# Patient Record
Sex: MALE | Race: ASIAN | NOT HISPANIC OR LATINO
[De-identification: names, ages, dates, MRNs, and addresses within clinical notes are randomized per-mention and may not be internally consistent; named-entity substitution may affect disease eponyms.]

---

## 2017-05-17 ENCOUNTER — APPOINTMENT (OUTPATIENT)
Dept: INTERNAL MEDICINE | Facility: CLINIC | Age: 47
End: 2017-05-17

## 2017-06-05 LAB
ALBUMIN SERPL ELPH-MCNC: 4.4 G/DL
ALP BLD-CCNC: 59 U/L
ALT SERPL-CCNC: 37 U/L
ANION GAP SERPL CALC-SCNC: 14 MMOL/L
AST SERPL-CCNC: 19 U/L
BASOPHILS # BLD AUTO: 0.01 K/UL
BASOPHILS NFR BLD AUTO: 0.2 %
BILIRUB SERPL-MCNC: 0.6 MG/DL
BUN SERPL-MCNC: 23 MG/DL
CALCIUM SERPL-MCNC: 9.6 MG/DL
CHLORIDE SERPL-SCNC: 103 MMOL/L
CO2 SERPL-SCNC: 24 MMOL/L
CREAT SERPL-MCNC: 1.09 MG/DL
EOSINOPHIL # BLD AUTO: 0.07 K/UL
EOSINOPHIL NFR BLD AUTO: 1.6 %
GLUCOSE SERPL-MCNC: 103 MG/DL
HCT VFR BLD CALC: 47.4 %
HGB BLD-MCNC: 15.9 G/DL
IMM GRANULOCYTES NFR BLD AUTO: 0.2 %
LYMPHOCYTES # BLD AUTO: 1.89 K/UL
LYMPHOCYTES NFR BLD AUTO: 44.3 %
MAN DIFF?: NORMAL
MCHC RBC-ENTMCNC: 31.7 PG
MCHC RBC-ENTMCNC: 33.5 GM/DL
MCV RBC AUTO: 94.6 FL
MONOCYTES # BLD AUTO: 0.33 K/UL
MONOCYTES NFR BLD AUTO: 7.7 %
NEUTROPHILS # BLD AUTO: 1.96 K/UL
NEUTROPHILS NFR BLD AUTO: 46 %
PLATELET # BLD AUTO: 155 K/UL
POTASSIUM SERPL-SCNC: 4.1 MMOL/L
PROT SERPL-MCNC: 7.5 G/DL
RBC # BLD: 5.01 M/UL
RBC # FLD: 12.2 %
SODIUM SERPL-SCNC: 141 MMOL/L
WBC # FLD AUTO: 4.27 K/UL

## 2017-06-06 LAB
AFP-TM SERPL-MCNC: 2.7 NG/ML
HAV IGG+IGM SER QL: REACTIVE
HBV DNA # SERPL NAA+PROBE: NOT DETECTED IU/ML
HBV E AB SER QL: POSITIVE
HBV E AG SER QL: NEGATIVE
HBV SURFACE AB SER QL: NONREACTIVE
HBV SURFACE AG SER QL: REACTIVE
HEPB DNA PCR LOG: NOT DETECTED LOGIU/ML

## 2017-06-23 ENCOUNTER — APPOINTMENT (OUTPATIENT)
Age: 47
End: 2017-06-23

## 2017-06-23 VITALS
HEART RATE: 69 BPM | SYSTOLIC BLOOD PRESSURE: 123 MMHG | TEMPERATURE: 97.8 F | WEIGHT: 167 LBS | DIASTOLIC BLOOD PRESSURE: 83 MMHG | RESPIRATION RATE: 17 BRPM | BODY MASS INDEX: 23.91 KG/M2 | HEIGHT: 70 IN

## 2017-07-07 ENCOUNTER — APPOINTMENT (OUTPATIENT)
Age: 47
End: 2017-07-07

## 2017-08-14 ENCOUNTER — APPOINTMENT (OUTPATIENT)
Dept: ULTRASOUND IMAGING | Facility: CLINIC | Age: 47
End: 2017-08-14
Payer: COMMERCIAL

## 2017-08-14 ENCOUNTER — OUTPATIENT (OUTPATIENT)
Dept: OUTPATIENT SERVICES | Facility: HOSPITAL | Age: 47
LOS: 1 days | End: 2017-08-14
Payer: COMMERCIAL

## 2017-08-14 DIAGNOSIS — B18.1 CHRONIC VIRAL HEPATITIS B WITHOUT DELTA-AGENT: ICD-10-CM

## 2017-08-14 PROCEDURE — 76700 US EXAM ABDOM COMPLETE: CPT

## 2017-08-14 PROCEDURE — 76700 US EXAM ABDOM COMPLETE: CPT | Mod: 26

## 2017-11-28 ENCOUNTER — NON-APPOINTMENT (OUTPATIENT)
Age: 47
End: 2017-11-28

## 2017-11-28 ENCOUNTER — APPOINTMENT (OUTPATIENT)
Dept: INTERNAL MEDICINE | Facility: CLINIC | Age: 47
End: 2017-11-28
Payer: COMMERCIAL

## 2017-11-28 VITALS
DIASTOLIC BLOOD PRESSURE: 80 MMHG | SYSTOLIC BLOOD PRESSURE: 129 MMHG | WEIGHT: 170 LBS | HEIGHT: 70 IN | OXYGEN SATURATION: 98 % | HEART RATE: 74 BPM | BODY MASS INDEX: 24.34 KG/M2

## 2017-11-28 PROCEDURE — 99396 PREV VISIT EST AGE 40-64: CPT

## 2017-12-04 LAB
CHOLEST SERPL-MCNC: 151 MG/DL
CHOLEST/HDLC SERPL: 2.5 RATIO
HBA1C MFR BLD HPLC: 5.4 %
HBV DNA # SERPL NAA+PROBE: NOT DETECTED IU/ML
HDLC SERPL-MCNC: 60 MG/DL
HEPB DNA PCR LOG: NOT DETECTED LOGIU/ML
LDLC SERPL CALC-MCNC: 77 MG/DL
TRIGL SERPL-MCNC: 71 MG/DL

## 2018-03-16 LAB
ALBUMIN SERPL ELPH-MCNC: 4.8 G/DL
ALP BLD-CCNC: 50 U/L
ALT SERPL-CCNC: 32 U/L
ANION GAP SERPL CALC-SCNC: 14 MMOL/L
AST SERPL-CCNC: 18 U/L
BASOPHILS # BLD AUTO: 0.01 K/UL
BASOPHILS NFR BLD AUTO: 0.2 %
BILIRUB SERPL-MCNC: 1 MG/DL
BUN SERPL-MCNC: 17 MG/DL
CALCIUM SERPL-MCNC: 9.4 MG/DL
CHLORIDE SERPL-SCNC: 101 MMOL/L
CO2 SERPL-SCNC: 27 MMOL/L
CREAT SERPL-MCNC: 1.04 MG/DL
EOSINOPHIL # BLD AUTO: 0.06 K/UL
EOSINOPHIL NFR BLD AUTO: 1 %
GLUCOSE SERPL-MCNC: 97 MG/DL
HCT VFR BLD CALC: 45.7 %
HGB BLD-MCNC: 15.4 G/DL
IMM GRANULOCYTES NFR BLD AUTO: 0.3 %
LYMPHOCYTES # BLD AUTO: 1.8 K/UL
LYMPHOCYTES NFR BLD AUTO: 30.8 %
MAN DIFF?: NORMAL
MCHC RBC-ENTMCNC: 31.5 PG
MCHC RBC-ENTMCNC: 33.7 GM/DL
MCV RBC AUTO: 93.5 FL
MONOCYTES # BLD AUTO: 0.33 K/UL
MONOCYTES NFR BLD AUTO: 5.6 %
NEUTROPHILS # BLD AUTO: 3.63 K/UL
NEUTROPHILS NFR BLD AUTO: 62.1 %
PLATELET # BLD AUTO: 165 K/UL
POTASSIUM SERPL-SCNC: 4.1 MMOL/L
PROT SERPL-MCNC: 7.8 G/DL
RBC # BLD: 4.89 M/UL
RBC # FLD: 12 %
SODIUM SERPL-SCNC: 142 MMOL/L
WBC # FLD AUTO: 5.85 K/UL

## 2018-05-04 LAB
BASOPHILS # BLD AUTO: 0 K/UL
BASOPHILS NFR BLD AUTO: 0 %
EOSINOPHIL # BLD AUTO: 0.05 K/UL
EOSINOPHIL NFR BLD AUTO: 1.2 %
HCT VFR BLD CALC: 47.6 %
HGB BLD-MCNC: 15.7 G/DL
IMM GRANULOCYTES NFR BLD AUTO: 0.5 %
LYMPHOCYTES # BLD AUTO: 2.01 K/UL
LYMPHOCYTES NFR BLD AUTO: 46.9 %
MAN DIFF?: NORMAL
MCHC RBC-ENTMCNC: 31.1 PG
MCHC RBC-ENTMCNC: 33 GM/DL
MCV RBC AUTO: 94.3 FL
MONOCYTES # BLD AUTO: 0.23 K/UL
MONOCYTES NFR BLD AUTO: 5.4 %
NEUTROPHILS # BLD AUTO: 1.98 K/UL
NEUTROPHILS NFR BLD AUTO: 46 %
PLATELET # BLD AUTO: 187 K/UL
RBC # BLD: 5.05 M/UL
RBC # FLD: 12.1 %
WBC # FLD AUTO: 4.29 K/UL

## 2018-05-07 LAB
AFP-TM SERPL-MCNC: 2.9 NG/ML
ALBUMIN SERPL ELPH-MCNC: 4.4 G/DL
ALP BLD-CCNC: 51 U/L
ALT SERPL-CCNC: 29 U/L
ANION GAP SERPL CALC-SCNC: 18 MMOL/L
AST SERPL-CCNC: 17 U/L
BILIRUB SERPL-MCNC: 0.7 MG/DL
BUN SERPL-MCNC: 14 MG/DL
CALCIUM SERPL-MCNC: 9.1 MG/DL
CHLORIDE SERPL-SCNC: 99 MMOL/L
CO2 SERPL-SCNC: 26 MMOL/L
CREAT SERPL-MCNC: 1.07 MG/DL
GLUCOSE SERPL-MCNC: 99 MG/DL
HBV DNA # SERPL NAA+PROBE: NOT DETECTED
HEPB DNA PCR LOG: NOT DETECTED LOGIU/ML
POTASSIUM SERPL-SCNC: 3.8 MMOL/L
PROT SERPL-MCNC: 8.1 G/DL
SODIUM SERPL-SCNC: 143 MMOL/L

## 2018-05-08 LAB
HBV E AB SER QL: POSITIVE
HBV E AG SER QL: NEGATIVE

## 2018-06-07 ENCOUNTER — FORM ENCOUNTER (OUTPATIENT)
Age: 48
End: 2018-06-07

## 2018-06-08 ENCOUNTER — OUTPATIENT (OUTPATIENT)
Dept: OUTPATIENT SERVICES | Facility: HOSPITAL | Age: 48
LOS: 1 days | End: 2018-06-08
Payer: COMMERCIAL

## 2018-06-08 ENCOUNTER — APPOINTMENT (OUTPATIENT)
Dept: ULTRASOUND IMAGING | Facility: CLINIC | Age: 48
End: 2018-06-08
Payer: COMMERCIAL

## 2018-06-08 DIAGNOSIS — B18.1 CHRONIC VIRAL HEPATITIS B WITHOUT DELTA-AGENT: ICD-10-CM

## 2018-06-08 PROCEDURE — 76700 US EXAM ABDOM COMPLETE: CPT

## 2018-06-08 PROCEDURE — 76700 US EXAM ABDOM COMPLETE: CPT | Mod: 26

## 2018-06-22 ENCOUNTER — APPOINTMENT (OUTPATIENT)
Dept: HEPATOLOGY | Facility: CLINIC | Age: 48
End: 2018-06-22
Payer: COMMERCIAL

## 2018-06-22 VITALS
HEIGHT: 70 IN | SYSTOLIC BLOOD PRESSURE: 132 MMHG | WEIGHT: 176 LBS | TEMPERATURE: 98.4 F | HEART RATE: 76 BPM | BODY MASS INDEX: 25.2 KG/M2 | RESPIRATION RATE: 17 BRPM | DIASTOLIC BLOOD PRESSURE: 83 MMHG

## 2018-06-22 PROCEDURE — 99214 OFFICE O/P EST MOD 30 MIN: CPT

## 2018-07-05 ENCOUNTER — TRANSCRIPTION ENCOUNTER (OUTPATIENT)
Age: 48
End: 2018-07-05

## 2018-07-05 ENCOUNTER — APPOINTMENT (OUTPATIENT)
Dept: HEPATOLOGY | Facility: CLINIC | Age: 48
End: 2018-07-05
Payer: COMMERCIAL

## 2018-07-05 PROCEDURE — 91200 LIVER ELASTOGRAPHY: CPT

## 2018-07-10 ENCOUNTER — APPOINTMENT (OUTPATIENT)
Dept: HEPATOLOGY | Facility: CLINIC | Age: 48
End: 2018-07-10

## 2018-12-05 ENCOUNTER — NON-APPOINTMENT (OUTPATIENT)
Age: 48
End: 2018-12-05

## 2018-12-05 ENCOUNTER — APPOINTMENT (OUTPATIENT)
Dept: INTERNAL MEDICINE | Facility: CLINIC | Age: 48
End: 2018-12-05
Payer: COMMERCIAL

## 2018-12-05 VITALS
BODY MASS INDEX: 25.91 KG/M2 | WEIGHT: 181 LBS | SYSTOLIC BLOOD PRESSURE: 130 MMHG | HEART RATE: 73 BPM | DIASTOLIC BLOOD PRESSURE: 80 MMHG | HEIGHT: 70 IN | OXYGEN SATURATION: 98 %

## 2018-12-05 PROCEDURE — 99396 PREV VISIT EST AGE 40-64: CPT | Mod: 25

## 2018-12-05 PROCEDURE — 93000 ELECTROCARDIOGRAM COMPLETE: CPT

## 2018-12-07 LAB
AFP-TM SERPL-MCNC: 3.4 NG/ML
HBV SURFACE AB SER QL: NONREACTIVE
HBV SURFACE AG SER QL: REACTIVE

## 2018-12-10 ENCOUNTER — FORM ENCOUNTER (OUTPATIENT)
Age: 48
End: 2018-12-10

## 2018-12-10 LAB
ALBUMIN SERPL ELPH-MCNC: 4.8 G/DL
ALP BLD-CCNC: 51 U/L
ALT SERPL-CCNC: 55 U/L
AST SERPL-CCNC: 27 U/L
BILIRUB DIRECT SERPL-MCNC: 0.2 MG/DL
BILIRUB INDIRECT SERPL-MCNC: 0.6 MG/DL
BILIRUB SERPL-MCNC: 0.8 MG/DL
PROT SERPL-MCNC: 7.7 G/DL

## 2018-12-11 ENCOUNTER — APPOINTMENT (OUTPATIENT)
Dept: ULTRASOUND IMAGING | Facility: CLINIC | Age: 48
End: 2018-12-11
Payer: COMMERCIAL

## 2018-12-11 ENCOUNTER — OUTPATIENT (OUTPATIENT)
Dept: OUTPATIENT SERVICES | Facility: HOSPITAL | Age: 48
LOS: 1 days | End: 2018-12-11
Payer: COMMERCIAL

## 2018-12-11 DIAGNOSIS — Z00.8 ENCOUNTER FOR OTHER GENERAL EXAMINATION: ICD-10-CM

## 2018-12-11 LAB
HBV DNA # SERPL NAA+PROBE: NOT DETECTED IU/ML
HEPB DNA PCR LOG: NOT DETECTED LOGIU/ML

## 2018-12-11 PROCEDURE — 76700 US EXAM ABDOM COMPLETE: CPT | Mod: 26

## 2018-12-11 PROCEDURE — 76700 US EXAM ABDOM COMPLETE: CPT

## 2018-12-17 NOTE — HEALTH RISK ASSESSMENT
[Good] : ~his/her~ current health as good [Very Good] : ~his/her~  mood as very good [] : No [No falls in past year] : Patient reported no falls in the past year [0] : 2) Feeling down, depressed, or hopeless: Not at all (0) [de-identified] : but increasing risk - see hpi [[id number]] : 0 [Change in mental status noted] : No change in mental status noted [Language] : denies difficulty with language [Behavior] : denies difficulty with behavior [Learning/Retaining New Information] : denies difficulty learning/retaining new information [Handling Complex Tasks] : denies difficulty handling complex tasks [Reasoning] : denies difficulty with reasoning [Spatial Ability and Orientation] : denies difficulty with spatial ability and orientation [None] : None [With Family] : lives with family [Employed] : employed [] :  [Feels Safe at Home] : Feels safe at home [Fully functional (bathing, dressing, toileting, transferring, walking, feeding)] : Fully functional (bathing, dressing, toileting, transferring, walking, feeding) [Fully functional (using the telephone, shopping, preparing meals, housekeeping, doing laundry, using] : Fully functional and needs no help or supervision to perform IADLs (using the telephone, shopping, preparing meals, housekeeping, doing laundry, using transportation, managing medications and managing finances) [Reports changes in hearing] : Reports no changes in hearing [Reports changes in vision] : Reports no changes in vision [Reports changes in dental health] : Reports no changes in dental health

## 2018-12-17 NOTE — PLAN
[FreeTextEntry1] : Pt had been doing generallly well, with exercise as linchpin of favorable metabolic/cardiovascular status. more recently, however, pt with impairment of ability to exercise well or even maintain safe gait without fall risk because of observed and objectively seen upon exam today right foot drop.  has been going on for months now exam suggests this relates to spinal disease - possibly disc dz, possibly stenosis. given recent bowel/bladder changes will be imperative to r/o  mass as well.  to see gi again regarding possible colonoscopy.  seeing through blood tests needed for hepatic surveillance. we discussed possibility of medications to tx what by hx and exam appears likely to represent bph.  he would not like to be on prescription medications but will consider saw palmetto.

## 2018-12-17 NOTE — PHYSICAL EXAM
[No Acute Distress] : no acute distress [Well Nourished] : well nourished [Well Developed] : well developed [Well-Appearing] : well-appearing [Normal Sclera/Conjunctiva] : normal sclera/conjunctiva [PERRL] : pupils equal round and reactive to light [EOMI] : extraocular movements intact [Normal Outer Ear/Nose] : the outer ears and nose were normal in appearance [Normal Oropharynx] : the oropharynx was normal [No JVD] : no jugular venous distention [Supple] : supple [No Lymphadenopathy] : no lymphadenopathy [Thyroid Normal, No Nodules] : the thyroid was normal and there were no nodules present [No Respiratory Distress] : no respiratory distress  [Clear to Auscultation] : lungs were clear to auscultation bilaterally [No Accessory Muscle Use] : no accessory muscle use [Normal Rate] : normal rate  [Regular Rhythm] : with a regular rhythm [Normal S1, S2] : normal S1 and S2 [No Murmur] : no murmur heard [No Carotid Bruits] : no carotid bruits [No Abdominal Bruit] : a ~M bruit was not heard ~T in the abdomen [No Varicosities] : no varicosities [Pedal Pulses Present] : the pedal pulses are present [No Edema] : there was no peripheral edema [No Extremity Clubbing/Cyanosis] : no extremity clubbing/cyanosis [No Palpable Aorta] : no palpable aorta [Soft] : abdomen soft [Non Tender] : non-tender [Non-distended] : non-distended [No Masses] : no abdominal mass palpated [No HSM] : no HSM [Normal Bowel Sounds] : normal bowel sounds [Normal Sphincter Tone] : normal sphincter tone [No Mass] : no mass [Stool Occult Blood] : stool negative for occult blood [Anus Abnormality] : the anus and perineum were normal [Prostate Tenderness] : the prostate was not tender [No Prostate Nodules] : no prostate nodules [Prostate Size ___ (0-4)] : prostate size [unfilled] (scale: 0-4) [Normal Posterior Cervical Nodes] : no posterior cervical lymphadenopathy [Normal Anterior Cervical Nodes] : no anterior cervical lymphadenopathy [No CVA Tenderness] : no CVA  tenderness [No Spinal Tenderness] : no spinal tenderness [No Joint Swelling] : no joint swelling [Grossly Normal Strength/Tone] : grossly normal strength/tone [No Rash] : no rash [Coordination Grossly Intact] : coordination grossly intact [Normal Affect] : the affect was normal [Normal Insight/Judgement] : insight and judgment were intact [de-identified] : r great toe flexion/extension impaired at 4-/5

## 2018-12-17 NOTE — HISTORY OF PRESENT ILLNESS
[FreeTextEntry1] : annual [de-identified] : Pt with recent issue with R foot which is new for him.  Has felt the R forefoot catch on the ground when he is hurrying places and at times just with walking.  he has nearly fallen several times but has not actually injured self as a result just yet.  he has hisotrically been a runner - but has had to curtail previously usual 3-5 mile run to 0.75 miles as a result.  does not recall recent specific injury to leg nor to back which might account for this.  has also had occaional bloody stool - more like blood admixed with stool.  went to see GI about this yrs ago and all reportedly wnl at that time.  first went at fairly young age btwn 2003-06 by his recollection, then again in 2014.  has had nocturia x1 as well.  no significantly weak stream, though force does seem to be less than prior, dribbling, no hematuria, no family hx.

## 2018-12-18 ENCOUNTER — APPOINTMENT (OUTPATIENT)
Dept: HEPATOLOGY | Facility: CLINIC | Age: 48
End: 2018-12-18

## 2019-01-25 ENCOUNTER — APPOINTMENT (OUTPATIENT)
Dept: HEPATOLOGY | Facility: CLINIC | Age: 49
End: 2019-01-25
Payer: COMMERCIAL

## 2019-01-25 VITALS
HEART RATE: 69 BPM | DIASTOLIC BLOOD PRESSURE: 84 MMHG | BODY MASS INDEX: 25.2 KG/M2 | WEIGHT: 176 LBS | SYSTOLIC BLOOD PRESSURE: 123 MMHG | RESPIRATION RATE: 16 BRPM | HEIGHT: 70 IN

## 2019-01-25 PROCEDURE — 99214 OFFICE O/P EST MOD 30 MIN: CPT

## 2019-01-26 NOTE — HISTORY OF PRESENT ILLNESS
[___ Month(s) Ago] : [unfilled] month(s) ago [None] : ~he/she~ had no significant interval events [Travel to Endemic Area] : travel to an endemic area [Needlestick Exposure] : no needlestick exposure [IV Drug Use] : no IV drug use [Tattoo] : no tattoos [Hemodialysis] : no hemodialysis [Transfusion before 1992] : no transfusion before 1992 [Transplant before 1992] : no transplant before 1992 [Alcohol Abuse] : no alcohol abuse [Autoimmune Disorder] : no autoimmune disorder [Household Contact to HBV] : no household contact to HBV [Occupational Exposure] : no occupational exposure [Wt Gain ___ Lbs] : no recent weight gain [de-identified] : A 48  year-old  American man returns for followup regarding chronic hepatitis B and fatty liver.\par \par He was diagnosed with hepatitis B in childhood when he was in [place] [facility] [place]. He emigrated from [place] in . His parents and brother reportedly do not have hepatitis B. His paternal grandfather  of liver cancer at the age of 40. His wife and children are vaccinated against hepatitis B. \par \par He was started on Viread since May 2014 by PCP due to high hepatitis B viremia and elevated liver enzymes. He was BeAg positive. \par He developed HBeAb positive since 2014 and eAg loss since 10/2016. \par \par An ultrasound of the abdomen from 2016  reported mild hepatic steatosis, unchanged but no focal liver lesion was seen.\par  A Fibroscan from 2016 demonstrated F1 fibrosis.\par \par 10/2016 HCV RNA was undetected, eAg negative, eAb positive, HBsAg positive, anti-HBs negative, AST 20, ALT 32, ALP 63, TB 0.7, CBC normal.\par \par 2016 abdominal US reported echogenic liver suggestive fatty liver, normal spleen and no ascites, patent portal venous system.\par 2016 Bone density was normal. \par \par 2017 HBV DNA undetected, eAb positive, ALT 37, immune to hepatitis A\par He was switched to Vemlidy since 2017. He has tolerated Vemlidy without side effects. \par 2017 HBV DNA undetected, liver tests normal ALT was 32 \par \par 2018 alpha-fetoprotein was normal, eAb positive, HBV DNA undetected,  CBC was normal, liver tests were normal ALT was 29\par 2018 abdominal ultrasound reported diffuse fatty infiltration of the liver\par \par Interval and current history:\par Patient was seen 7 months ago. \par \par 2018 Fibroscan reported S3 steatosis and F0-F1 fibrosis. \par 2018 ALT 53 (55), AST, ALP and TB were normal, AFP normal, HBV DNA undetected, HBsAg positive, anti-HBs negative, eAg negative, eAb positive, \par \par 2018 abdominal US reported unchanged moderate diffuse hepatic steatosis, no focal liver lesion, patent portal veins, normal GB, and bile ducts, and spleen, and no ascites. \par  \par He denies fever, chills, nausea, vomiting, abdominal pain, GI bleeding, jaundice, pruritus, skin rash, anorexia but has fatigue and gained 9 pounds over past 7 months. He has complained of right foot drop which is followed by PCP.

## 2019-01-26 NOTE — REVIEW OF SYSTEMS
[Recent Weight Gain (___ Lbs)] : recent [unfilled] ~Ulb weight gain [As Noted in HPI] : as noted in HPI [Negative] : Heme/Lymph [Fever] : no fever [Chills] : no chills [Feeling Poorly] : not feeling poorly [Feeling Tired] : not feeling tired [Recent Weight Loss (___ Lbs)] : no recent weight loss [Eye Pain] : no eye pain [Red Eyes] : eyes not red [Dry Eyes] : no dryness of the eyes [Chest Pain] : no chest pain [Palpitations] : no palpitations [Lower Ext Edema] : no extremity edema [Shortness Of Breath] : no shortness of breath [Wheezing] : no wheezing [Cough] : no cough [Abdominal Pain] : no abdominal pain [Vomiting] : no vomiting [Constipation] : no constipation [Diarrhea] : no diarrhea [Heartburn] : no heartburn [Melena] : no melena [Itching] : no itching [FreeTextEntry7] : denies rectal bleeding, or nausea

## 2019-01-26 NOTE — ASSESSMENT
[FreeTextEntry1] : A 48  year-old  American man returns for followup regarding chronic hepatitis B and fatty liver.\par \par He was diagnosed with hepatitis B in childhood when he was in [place]. His paternal grandfather  of liver cancer at the age of 40. His wife and children are vaccinated against hepatitis B. \par \par He was started on Viread since May 2014 by PCP due to high hepatitis B viremia, eAg positive, and elevated liver enzymes. HBV eAb became  positive  since 2014 and eAg loss noted since 10/2016. \par \par ALT became normal in 2014.  HBV DNA has been undetected since 2017.\par Fatty liver was seen on abdominal US since  but no focal liver mass on liver imaging over past several years. \par \par He was switched to Vemlidy since 2017. He has  tolerated Vemlidy without side effects. \par \par 2018 Fibroscan reported S3 steatosis and F0-F1 fibrosis. \par \par His most recent blood work from 2018  showed ALT of 53 and 55 respectively but HBV DNA was undetected. HBV serology unchanged. \par \par 2018 abdominal US reported unchanged moderate diffuse hepatic steatosis, no focal liver lesion, patent portal veins, normal GB, and bile ducts, and spleen, and no ascites. \par \par He had elevated ALT despite undetected HBV DNA. The cause of elevated ALT is unclear and unlikely from HBV infection, possibly from NAFLD with SANCHEZ. \par \par I have explained to him and educated him at length the natural history of chronic hepatitis B, and NAFLD and SANCHEZ, disease progression to cirrhosis and risk of hepatocellular carcinoma, the risks and benefits of hepatitis B treatment and side effects of Vemlidy.\par \par I have advised him to continue Vemlidy 25 mg daily. \par I have requested blood work to trend ALT and workup for elevated ALT to be done in 2 months and asked him to call for results.\par \par I have recommended healthy lifestyle, regular exercise to reduce the risk of non-alcoholic fatty liver disease, avoidance of hepatotoxic agents, and a return for followup in 6 months.

## 2019-01-26 NOTE — CONSULT LETTER
[Dear  ___] : Dear  [unfilled], [Courtesy Letter:] : I had the pleasure of seeing your patient, [unfilled], in my office today. [Please see my note below.] : Please see my note below. [Consult Closing:] : Thank you very much for allowing me to participate in the care of this patient.  If you have any questions, please do not hesitate to contact me. [Sincerely,] : Sincerely, [Préez Galvez M.D.] : Pérez Galvez M.D. [FreeTextEntry2] : Anthony Vicente MD

## 2019-01-26 NOTE — PHYSICAL EXAM
[Scleral Icterus] : No Scleral Icterus [Spider Angioma] : No spider angioma(s) were observed [Abdominal  Ascites] : no ascites [Splenomegaly] : no splenomegaly [Liver Palpable ___ Finger Breadths Below Costal Margin] : was not palpable below costal margin [Asterixis] : no asterixis observed [Jaundice] : No jaundice

## 2019-03-22 LAB — CERULOPLASMIN SERPL-MCNC: 18 MG/DL

## 2019-03-23 ENCOUNTER — TRANSCRIPTION ENCOUNTER (OUTPATIENT)
Age: 49
End: 2019-03-23

## 2019-03-23 LAB
ALBUMIN SERPL ELPH-MCNC: 4.8 G/DL
ALP BLD-CCNC: 50 U/L
ALT SERPL-CCNC: 37 U/L
AST SERPL-CCNC: 26 U/L
BILIRUB DIRECT SERPL-MCNC: 0.2 MG/DL
BILIRUB INDIRECT SERPL-MCNC: 0.7 MG/DL
BILIRUB SERPL-MCNC: 0.8 MG/DL
DEPRECATED KAPPA LC FREE/LAMBDA SER: 1 RATIO
IGA SER QL IEP: 212 MG/DL
IGG SER QL IEP: 1446 MG/DL
IGM SER QL IEP: 85 MG/DL
KAPPA LC CSF-MCNC: 1.41 MG/DL
KAPPA LC SERPL-MCNC: 1.41 MG/DL
PROT SERPL-MCNC: 8 G/DL

## 2019-03-24 LAB — SMOOTH MUSCLE AB SER QL IF: NORMAL

## 2019-03-26 LAB
ANA SER IF-ACNC: NEGATIVE
HBV DNA # SERPL NAA+PROBE: NOT DETECTED IU/ML
HEPB DNA PCR LOG: NOT DETECTED LOGIU/ML

## 2019-03-27 LAB
TTG IGA SER IA-ACNC: <1.2 U/ML
TTG IGA SER-ACNC: NEGATIVE

## 2019-04-05 LAB
MISCELLANEOUS TEST: NORMAL
PROC NAME: NORMAL

## 2019-05-06 ENCOUNTER — RX RENEWAL (OUTPATIENT)
Age: 49
End: 2019-05-06

## 2019-07-14 LAB
AFP-TM SERPL-MCNC: 3 NG/ML
ALBUMIN SERPL ELPH-MCNC: 4.9 G/DL
ALP BLD-CCNC: 49 U/L
ALT SERPL-CCNC: 53 U/L
ANION GAP SERPL CALC-SCNC: 11 MMOL/L
AST SERPL-CCNC: 25 U/L
BASOPHILS # BLD AUTO: 0 K/UL
BASOPHILS NFR BLD AUTO: 0 %
BILIRUB SERPL-MCNC: 0.7 MG/DL
BUN SERPL-MCNC: 20 MG/DL
CALCIUM SERPL-MCNC: 9.2 MG/DL
CHLORIDE SERPL-SCNC: 105 MMOL/L
CHOLEST SERPL-MCNC: 148 MG/DL
CHOLEST/HDLC SERPL: 2.8 RATIO
CO2 SERPL-SCNC: 28 MMOL/L
CREAT SERPL-MCNC: 1.01 MG/DL
EOSINOPHIL # BLD AUTO: 0.04 K/UL
EOSINOPHIL NFR BLD AUTO: 1.1 %
GLUCOSE SERPL-MCNC: 101 MG/DL
HBA1C MFR BLD HPLC: 5.5 %
HBV DNA # SERPL NAA+PROBE: NOT DETECTED
HBV E AB SER QL: POSITIVE
HBV E AG SER QL: NEGATIVE
HCT VFR BLD CALC: 47.2 %
HDLC SERPL-MCNC: 52 MG/DL
HEPB DNA PCR LOG: NOT DETECTED LOGIU/ML
HGB BLD-MCNC: 15.7 G/DL
IMM GRANULOCYTES NFR BLD AUTO: 0.3 %
LDLC SERPL CALC-MCNC: 81 MG/DL
LYMPHOCYTES # BLD AUTO: 1.49 K/UL
LYMPHOCYTES NFR BLD AUTO: 41.3 %
MAN DIFF?: NORMAL
MCHC RBC-ENTMCNC: 31.2 PG
MCHC RBC-ENTMCNC: 33.3 GM/DL
MCV RBC AUTO: 93.8 FL
MONOCYTES # BLD AUTO: 0.23 K/UL
MONOCYTES NFR BLD AUTO: 6.4 %
NEUTROPHILS # BLD AUTO: 1.84 K/UL
NEUTROPHILS NFR BLD AUTO: 50.9 %
PLATELET # BLD AUTO: 159 K/UL
POTASSIUM SERPL-SCNC: 3.9 MMOL/L
PROT SERPL-MCNC: 7.5 G/DL
PSA SERPL-MCNC: 0.89 NG/ML
RBC # BLD: 5.03 M/UL
RBC # FLD: 12.4 %
SODIUM SERPL-SCNC: 144 MMOL/L
TRIGL SERPL-MCNC: 75 MG/DL
WBC # FLD AUTO: 3.61 K/UL

## 2019-07-18 ENCOUNTER — APPOINTMENT (OUTPATIENT)
Dept: INTERNAL MEDICINE | Facility: CLINIC | Age: 49
End: 2019-07-18
Payer: COMMERCIAL

## 2019-07-18 VITALS
OXYGEN SATURATION: 97 % | WEIGHT: 179 LBS | DIASTOLIC BLOOD PRESSURE: 68 MMHG | BODY MASS INDEX: 25.68 KG/M2 | SYSTOLIC BLOOD PRESSURE: 104 MMHG | HEART RATE: 72 BPM

## 2019-07-18 DIAGNOSIS — M21.371 FOOT DROP, RIGHT FOOT: ICD-10-CM

## 2019-07-18 PROCEDURE — 99214 OFFICE O/P EST MOD 30 MIN: CPT

## 2019-07-25 ENCOUNTER — CLINICAL ADVICE (OUTPATIENT)
Age: 49
End: 2019-07-25

## 2019-08-09 PROBLEM — M21.371 FOOT DROP, RIGHT: Status: ACTIVE | Noted: 2018-12-05

## 2019-08-09 NOTE — HISTORY OF PRESENT ILLNESS
[FreeTextEntry1] : eye issue [de-identified] : Pt most concerned recently about development of central serous retinopathy.  has affefcted vision at most times of day - blurry/decreased acuity.  this seems to follow on the heels of retinal detachment 4/19.  the former has been tx'ed with eplerenone 50 mg qd. he is urinating more since being on this medication - up to 2-3x/nite.  he has had still (milder) foot drop but has sometimes tripped because of this.  balance is not as good - exercise has been down on these several counts.  he is following with Dr. Galvez and remains on Vemlidy which reportedly does not cause this ophthalmologic side effect.

## 2019-08-09 NOTE — PHYSICAL EXAM
[No Acute Distress] : no acute distress [Well Nourished] : well nourished [Well-Appearing] : well-appearing [Well Developed] : well developed [Normal Sclera/Conjunctiva] : normal sclera/conjunctiva [PERRL] : pupils equal round and reactive to light [EOMI] : extraocular movements intact [Normal Outer Ear/Nose] : the outer ears and nose were normal in appearance [Normal Oropharynx] : the oropharynx was normal [No JVD] : no jugular venous distention [Supple] : supple [No Lymphadenopathy] : no lymphadenopathy [Thyroid Normal, No Nodules] : the thyroid was normal and there were no nodules present [No Respiratory Distress] : no respiratory distress  [No Accessory Muscle Use] : no accessory muscle use [Clear to Auscultation] : lungs were clear to auscultation bilaterally [Normal Rate] : normal rate  [Regular Rhythm] : with a regular rhythm [Normal S1, S2] : normal S1 and S2 [No Murmur] : no murmur heard [No Carotid Bruits] : no carotid bruits [No Varicosities] : no varicosities [No Abdominal Bruit] : a ~M bruit was not heard ~T in the abdomen [Pedal Pulses Present] : the pedal pulses are present [No Edema] : there was no peripheral edema [No Palpable Aorta] : no palpable aorta [No Extremity Clubbing/Cyanosis] : no extremity clubbing/cyanosis [Soft] : abdomen soft [Non-distended] : non-distended [Non Tender] : non-tender [No Masses] : no abdominal mass palpated [No HSM] : no HSM [Normal Bowel Sounds] : normal bowel sounds [Normal Anterior Cervical Nodes] : no anterior cervical lymphadenopathy [Normal Posterior Cervical Nodes] : no posterior cervical lymphadenopathy [No CVA Tenderness] : no CVA  tenderness [No Joint Swelling] : no joint swelling [No Spinal Tenderness] : no spinal tenderness [Grossly Normal Strength/Tone] : grossly normal strength/tone [Coordination Grossly Intact] : coordination grossly intact [No Rash] : no rash [No Focal Deficits] : no focal deficits [Normal Gait] : normal gait [Normal Affect] : the affect was normal [Deep Tendon Reflexes (DTR)] : deep tendon reflexes were 2+ and symmetric [Normal Insight/Judgement] : insight and judgment were intact

## 2019-08-22 ENCOUNTER — FORM ENCOUNTER (OUTPATIENT)
Age: 49
End: 2019-08-22

## 2019-08-23 ENCOUNTER — APPOINTMENT (OUTPATIENT)
Dept: ULTRASOUND IMAGING | Facility: CLINIC | Age: 49
End: 2019-08-23
Payer: COMMERCIAL

## 2019-08-23 ENCOUNTER — OUTPATIENT (OUTPATIENT)
Dept: OUTPATIENT SERVICES | Facility: HOSPITAL | Age: 49
LOS: 1 days | End: 2019-08-23
Payer: COMMERCIAL

## 2019-08-23 DIAGNOSIS — Z00.8 ENCOUNTER FOR OTHER GENERAL EXAMINATION: ICD-10-CM

## 2019-08-23 PROCEDURE — 76700 US EXAM ABDOM COMPLETE: CPT | Mod: 26

## 2019-08-23 PROCEDURE — 76700 US EXAM ABDOM COMPLETE: CPT

## 2019-08-26 LAB
AFP-TM SERPL-MCNC: 2.8 NG/ML
ALBUMIN SERPL ELPH-MCNC: 4.7 G/DL
ALP BLD-CCNC: 54 U/L
ALT SERPL-CCNC: 67 U/L
ANION GAP SERPL CALC-SCNC: 13 MMOL/L
AST SERPL-CCNC: 27 U/L
BASOPHILS # BLD AUTO: 0.02 K/UL
BASOPHILS NFR BLD AUTO: 0.5 %
BILIRUB SERPL-MCNC: 0.8 MG/DL
BUN SERPL-MCNC: 18 MG/DL
CALCIUM SERPL-MCNC: 9.1 MG/DL
CHLORIDE SERPL-SCNC: 104 MMOL/L
CO2 SERPL-SCNC: 24 MMOL/L
CREAT SERPL-MCNC: 0.99 MG/DL
EOSINOPHIL # BLD AUTO: 0.05 K/UL
EOSINOPHIL NFR BLD AUTO: 1.2 %
GLUCOSE SERPL-MCNC: 109 MG/DL
HBV DNA # SERPL NAA+PROBE: NOT DETECTED IU/ML
HBV SURFACE AB SER QL: NONREACTIVE
HBV SURFACE AG SER QL: REACTIVE
HCT VFR BLD CALC: 48 %
HEPB DNA PCR LOG: NOT DETECTED LOGIU/ML
HGB BLD-MCNC: 16.1 G/DL
IMM GRANULOCYTES NFR BLD AUTO: 0.5 %
LYMPHOCYTES # BLD AUTO: 1.56 K/UL
LYMPHOCYTES NFR BLD AUTO: 38.4 %
MAN DIFF?: NORMAL
MCHC RBC-ENTMCNC: 31.3 PG
MCHC RBC-ENTMCNC: 33.5 GM/DL
MCV RBC AUTO: 93.2 FL
MONOCYTES # BLD AUTO: 0.32 K/UL
MONOCYTES NFR BLD AUTO: 7.9 %
NEUTROPHILS # BLD AUTO: 2.09 K/UL
NEUTROPHILS NFR BLD AUTO: 51.5 %
PLATELET # BLD AUTO: 195 K/UL
POTASSIUM SERPL-SCNC: 4.1 MMOL/L
PROT SERPL-MCNC: 7.5 G/DL
RBC # BLD: 5.15 M/UL
RBC # FLD: 11.8 %
SODIUM SERPL-SCNC: 141 MMOL/L
WBC # FLD AUTO: 4.06 K/UL

## 2019-08-28 LAB
HBV E AB SER QL: POSITIVE
HBV E AG SER QL: NEGATIVE
MISCELLANEOUS TEST: NORMAL
PROC NAME: NORMAL

## 2019-09-03 ENCOUNTER — APPOINTMENT (OUTPATIENT)
Dept: HEPATOLOGY | Facility: CLINIC | Age: 49
End: 2019-09-03
Payer: COMMERCIAL

## 2019-09-03 VITALS
DIASTOLIC BLOOD PRESSURE: 78 MMHG | SYSTOLIC BLOOD PRESSURE: 119 MMHG | BODY MASS INDEX: 25.77 KG/M2 | HEIGHT: 70 IN | HEART RATE: 73 BPM | TEMPERATURE: 98.4 F | RESPIRATION RATE: 16 BRPM | WEIGHT: 180 LBS

## 2019-09-03 PROCEDURE — 99213 OFFICE O/P EST LOW 20 MIN: CPT

## 2019-09-06 LAB
MISCELLANEOUS TEST: NORMAL
PROC NAME: NORMAL

## 2019-10-20 LAB
ALBUMIN SERPL ELPH-MCNC: 5 G/DL
ALP BLD-CCNC: 57 U/L
ALT SERPL-CCNC: 62 U/L
ANION GAP SERPL CALC-SCNC: 14 MMOL/L
APPEARANCE: CLEAR
AST SERPL-CCNC: 22 U/L
BACTERIA: NEGATIVE
BASOPHILS # BLD AUTO: 0.03 K/UL
BASOPHILS NFR BLD AUTO: 0.6 %
BILIRUB SERPL-MCNC: 0.4 MG/DL
BILIRUBIN URINE: NEGATIVE
BLOOD URINE: NEGATIVE
BUN SERPL-MCNC: 15 MG/DL
CALCIUM SERPL-MCNC: 9.5 MG/DL
CHLORIDE SERPL-SCNC: 101 MMOL/L
CO2 SERPL-SCNC: 27 MMOL/L
COLOR: NORMAL
CREAT SERPL-MCNC: 1 MG/DL
EOSINOPHIL # BLD AUTO: 0.07 K/UL
EOSINOPHIL NFR BLD AUTO: 1.4 %
GLUCOSE QUALITATIVE U: NEGATIVE
GLUCOSE SERPL-MCNC: 98 MG/DL
HCT VFR BLD CALC: 50.1 %
HGB BLD-MCNC: 16.7 G/DL
HYALINE CASTS: 0 /LPF
IMM GRANULOCYTES NFR BLD AUTO: 0.6 %
KETONES URINE: NEGATIVE
LEUKOCYTE ESTERASE URINE: ABNORMAL
LYMPHOCYTES # BLD AUTO: 1.88 K/UL
LYMPHOCYTES NFR BLD AUTO: 36.3 %
MAN DIFF?: NORMAL
MCHC RBC-ENTMCNC: 31 PG
MCHC RBC-ENTMCNC: 33.3 GM/DL
MCV RBC AUTO: 92.9 FL
MICROSCOPIC-UA: NORMAL
MONOCYTES # BLD AUTO: 0.4 K/UL
MONOCYTES NFR BLD AUTO: 7.7 %
NEUTROPHILS # BLD AUTO: 2.77 K/UL
NEUTROPHILS NFR BLD AUTO: 53.4 %
NITRITE URINE: NEGATIVE
PH URINE: 6.5
PLATELET # BLD AUTO: 200 K/UL
POTASSIUM SERPL-SCNC: 4.1 MMOL/L
PROT SERPL-MCNC: 7.9 G/DL
PROTEIN URINE: NEGATIVE
RBC # BLD: 5.39 M/UL
RBC # FLD: 11.6 %
RED BLOOD CELLS URINE: 0 /HPF
SODIUM SERPL-SCNC: 142 MMOL/L
SPECIFIC GRAVITY URINE: 1.01
SQUAMOUS EPITHELIAL CELLS: 1 /HPF
UROBILINOGEN URINE: NORMAL
WBC # FLD AUTO: 5.18 K/UL
WHITE BLOOD CELLS URINE: 6 /HPF

## 2019-12-13 LAB
ALBUMIN SERPL ELPH-MCNC: 4.7 G/DL
ALP BLD-CCNC: 53 U/L
ALT SERPL-CCNC: 84 U/L
ANION GAP SERPL CALC-SCNC: 14 MMOL/L
AST SERPL-CCNC: 38 U/L
BASOPHILS # BLD AUTO: 0.02 K/UL
BASOPHILS NFR BLD AUTO: 0.5 %
BILIRUB SERPL-MCNC: 0.8 MG/DL
BUN SERPL-MCNC: 19 MG/DL
CALCIUM SERPL-MCNC: 9.5 MG/DL
CHLORIDE SERPL-SCNC: 104 MMOL/L
CO2 SERPL-SCNC: 26 MMOL/L
CREAT SERPL-MCNC: 1 MG/DL
EOSINOPHIL # BLD AUTO: 0.04 K/UL
EOSINOPHIL NFR BLD AUTO: 1.1 %
GLUCOSE SERPL-MCNC: 106 MG/DL
HBV DNA # SERPL NAA+PROBE: NOT DETECTED
HBV SURFACE AB SER QL: NONREACTIVE
HBV SURFACE AG SER QL: REACTIVE
HCT VFR BLD CALC: 49.1 %
HEPB DNA PCR LOG: NOT DETECTED LOG10IU/ML
HGB BLD-MCNC: 16.2 G/DL
IMM GRANULOCYTES NFR BLD AUTO: 0.3 %
LYMPHOCYTES # BLD AUTO: 1.58 K/UL
LYMPHOCYTES NFR BLD AUTO: 42.5 %
MAN DIFF?: NORMAL
MCHC RBC-ENTMCNC: 31.1 PG
MCHC RBC-ENTMCNC: 33 GM/DL
MCV RBC AUTO: 94.2 FL
MONOCYTES # BLD AUTO: 0.25 K/UL
MONOCYTES NFR BLD AUTO: 6.7 %
NEUTROPHILS # BLD AUTO: 1.82 K/UL
NEUTROPHILS NFR BLD AUTO: 48.9 %
PLATELET # BLD AUTO: 179 K/UL
POTASSIUM SERPL-SCNC: 4 MMOL/L
PROT SERPL-MCNC: 7.3 G/DL
RBC # BLD: 5.21 M/UL
RBC # FLD: 11.8 %
SODIUM SERPL-SCNC: 144 MMOL/L
WBC # FLD AUTO: 3.72 K/UL

## 2020-02-19 ENCOUNTER — APPOINTMENT (OUTPATIENT)
Dept: INTERNAL MEDICINE | Facility: CLINIC | Age: 50
End: 2020-02-19
Payer: COMMERCIAL

## 2020-02-19 VITALS
WEIGHT: 184 LBS | HEART RATE: 76 BPM | HEIGHT: 69 IN | BODY MASS INDEX: 27.25 KG/M2 | DIASTOLIC BLOOD PRESSURE: 70 MMHG | OXYGEN SATURATION: 97 % | SYSTOLIC BLOOD PRESSURE: 108 MMHG

## 2020-02-19 DIAGNOSIS — R00.2 PALPITATIONS: ICD-10-CM

## 2020-02-19 PROCEDURE — 99396 PREV VISIT EST AGE 40-64: CPT

## 2020-03-02 NOTE — HEALTH RISK ASSESSMENT
[Good] : ~his/her~  mood as  good [] : No [No] : In the past 12 months have you used drugs other than those required for medical reasons? No [No falls in past year] : Patient reported no falls in the past year [de-identified] : as in hpi [0] : 2) Feeling down, depressed, or hopeless: Not at all (0) [de-identified] : as in hpi [de-identified] : healthy diverse [[id number]] : 0 [Change in mental status noted] : No change in mental status noted [Language] : denies difficulty with language [Learning/Retaining New Information] : denies difficulty learning/retaining new information [Behavior] : denies difficulty with behavior [Handling Complex Tasks] : denies difficulty handling complex tasks [Reasoning] : denies difficulty with reasoning [None] : None [With Family] : lives with family [] :  [Employed] : employed [# Of Children ___] : has [unfilled] children [Feels Safe at Home] : Feels safe at home [Fully functional (bathing, dressing, toileting, transferring, walking, feeding)] : Fully functional (bathing, dressing, toileting, transferring, walking, feeding) [Fully functional (using the telephone, shopping, preparing meals, housekeeping, doing laundry, using] : Fully functional and needs no help or supervision to perform IADLs (using the telephone, shopping, preparing meals, housekeeping, doing laundry, using transportation, managing medications and managing finances) [Reports changes in hearing] : Reports no changes in hearing [Reports changes in vision] : Reports no changes in vision [Reports normal functional visual acuity (ie: able to read med bottle)] : Reports normal functional visual acuity [Reports changes in dental health] : Reports no changes in dental health [de-identified] : see hpi

## 2020-03-02 NOTE — PHYSICAL EXAM
[No Acute Distress] : no acute distress [Well Nourished] : well nourished [Well Developed] : well developed [Well-Appearing] : well-appearing [Normal Sclera/Conjunctiva] : normal sclera/conjunctiva [PERRL] : pupils equal round and reactive to light [EOMI] : extraocular movements intact [Normal Outer Ear/Nose] : the outer ears and nose were normal in appearance [Normal Oropharynx] : the oropharynx was normal [No JVD] : no jugular venous distention [No Lymphadenopathy] : no lymphadenopathy [Supple] : supple [Thyroid Normal, No Nodules] : the thyroid was normal and there were no nodules present [No Respiratory Distress] : no respiratory distress  [No Accessory Muscle Use] : no accessory muscle use [Clear to Auscultation] : lungs were clear to auscultation bilaterally [Normal Rate] : normal rate  [Regular Rhythm] : with a regular rhythm [Normal S1, S2] : normal S1 and S2 [No Murmur] : no murmur heard [No Carotid Bruits] : no carotid bruits [No Abdominal Bruit] : a ~M bruit was not heard ~T in the abdomen [No Varicosities] : no varicosities [Pedal Pulses Present] : the pedal pulses are present [No Edema] : there was no peripheral edema [No Palpable Aorta] : no palpable aorta [No Extremity Clubbing/Cyanosis] : no extremity clubbing/cyanosis [Soft] : abdomen soft [Non Tender] : non-tender [Non-distended] : non-distended [No HSM] : no HSM [No Masses] : no abdominal mass palpated [Normal Bowel Sounds] : normal bowel sounds [Normal Posterior Cervical Nodes] : no posterior cervical lymphadenopathy [No CVA Tenderness] : no CVA  tenderness [Normal Anterior Cervical Nodes] : no anterior cervical lymphadenopathy [No Spinal Tenderness] : no spinal tenderness [No Joint Swelling] : no joint swelling [Grossly Normal Strength/Tone] : grossly normal strength/tone [Coordination Grossly Intact] : coordination grossly intact [No Rash] : no rash [No Focal Deficits] : no focal deficits [Normal Gait] : normal gait [Deep Tendon Reflexes (DTR)] : deep tendon reflexes were 2+ and symmetric [Normal Affect] : the affect was normal [Normal Insight/Judgement] : insight and judgment were intact

## 2020-03-02 NOTE — PLAN
[FreeTextEntry1] : Pt will continue close follow-up with specialists.  will work on diet to optimize weight and keep metabolics in best ranges.  he has been working on tailoring exercise to needs as well.  checking on bloods today incl psa.

## 2020-03-02 NOTE — HISTORY OF PRESENT ILLNESS
[FreeTextEntry1] : annual [de-identified] : Pt following up closely with ophthalmology.  has still been advised to continue on eplerenone for central serous retinopathy.  vision had improved for a period but then seemed to be affected again.  he is avoiding driving at night to some degree in order to limit sx.  he has also been reducing running which seems to be an aggravating factor as well -- using elliptical.  this is easier on hip/back as well.  he is doing some yoga.  he is seeing hepatology regularly and remaining on vemlidy.  has gained 5-6#.  views 175# as ideal and intends to et back down to that region.  some urinary frequency - has been mitigated with saw palmetto.  nocturia x2-3 otherwise - aggravated by caffeine as well - trying to reduce.

## 2020-03-06 ENCOUNTER — APPOINTMENT (OUTPATIENT)
Dept: HEPATOLOGY | Facility: CLINIC | Age: 50
End: 2020-03-06
Payer: COMMERCIAL

## 2020-03-06 VITALS
WEIGHT: 181 LBS | RESPIRATION RATE: 16 BRPM | HEIGHT: 69 IN | HEART RATE: 79 BPM | DIASTOLIC BLOOD PRESSURE: 74 MMHG | BODY MASS INDEX: 26.81 KG/M2 | SYSTOLIC BLOOD PRESSURE: 117 MMHG | TEMPERATURE: 98.1 F

## 2020-03-06 PROCEDURE — 99214 OFFICE O/P EST MOD 30 MIN: CPT

## 2020-03-30 LAB
ALBUMIN SERPL ELPH-MCNC: 4.8 G/DL
ALP BLD-CCNC: 50 U/L
ALT SERPL-CCNC: 83 U/L
ANION GAP SERPL CALC-SCNC: 13 MMOL/L
AST SERPL-CCNC: 35 U/L
BASOPHILS # BLD AUTO: 0.01 K/UL
BASOPHILS NFR BLD AUTO: 0.3 %
BILIRUB SERPL-MCNC: 0.6 MG/DL
BUN SERPL-MCNC: 14 MG/DL
CALCIUM SERPL-MCNC: 9 MG/DL
CHLORIDE SERPL-SCNC: 104 MMOL/L
CHOLEST SERPL-MCNC: 127 MG/DL
CHOLEST/HDLC SERPL: 2.8 RATIO
CO2 SERPL-SCNC: 24 MMOL/L
CREAT SERPL-MCNC: 0.88 MG/DL
EOSINOPHIL # BLD AUTO: 0.06 K/UL
EOSINOPHIL NFR BLD AUTO: 1.6 %
ESTIMATED AVERAGE GLUCOSE: 117 MG/DL
GLUCOSE SERPL-MCNC: 101 MG/DL
HBA1C MFR BLD HPLC: 5.7 %
HCT VFR BLD CALC: 46.9 %
HDLC SERPL-MCNC: 46 MG/DL
HGB BLD-MCNC: 15.2 G/DL
IMM GRANULOCYTES NFR BLD AUTO: 0.3 %
LDLC SERPL CALC-MCNC: 57 MG/DL
LYMPHOCYTES # BLD AUTO: 1.39 K/UL
LYMPHOCYTES NFR BLD AUTO: 36.1 %
MAN DIFF?: NORMAL
MCHC RBC-ENTMCNC: 30.2 PG
MCHC RBC-ENTMCNC: 32.4 GM/DL
MCV RBC AUTO: 93.1 FL
MONOCYTES # BLD AUTO: 0.27 K/UL
MONOCYTES NFR BLD AUTO: 7 %
NEUTROPHILS # BLD AUTO: 2.11 K/UL
NEUTROPHILS NFR BLD AUTO: 54.7 %
PLATELET # BLD AUTO: 173 K/UL
POTASSIUM SERPL-SCNC: 4.1 MMOL/L
PROT SERPL-MCNC: 7.1 G/DL
PSA SERPL-MCNC: 0.75 NG/ML
RBC # BLD: 5.04 M/UL
RBC # FLD: 11.6 %
SODIUM SERPL-SCNC: 142 MMOL/L
TRIGL SERPL-MCNC: 122 MG/DL
TSH SERPL-ACNC: 1.52 UIU/ML
WBC # FLD AUTO: 3.85 K/UL

## 2020-04-22 ENCOUNTER — TRANSCRIPTION ENCOUNTER (OUTPATIENT)
Age: 50
End: 2020-04-22

## 2020-07-20 ENCOUNTER — RESULT REVIEW (OUTPATIENT)
Age: 50
End: 2020-07-20

## 2020-07-20 ENCOUNTER — APPOINTMENT (OUTPATIENT)
Dept: MRI IMAGING | Facility: IMAGING CENTER | Age: 50
End: 2020-07-20
Payer: COMMERCIAL

## 2020-07-20 ENCOUNTER — OUTPATIENT (OUTPATIENT)
Dept: OUTPATIENT SERVICES | Facility: HOSPITAL | Age: 50
LOS: 1 days | End: 2020-07-20
Payer: COMMERCIAL

## 2020-07-20 DIAGNOSIS — B18.1 CHRONIC VIRAL HEPATITIS B WITHOUT DELTA-AGENT: ICD-10-CM

## 2020-07-20 DIAGNOSIS — K76.0 FATTY (CHANGE OF) LIVER, NOT ELSEWHERE CLASSIFIED: ICD-10-CM

## 2020-07-20 DIAGNOSIS — R74.0 NONSPECIFIC ELEVATION OF LEVELS OF TRANSAMINASE AND LACTIC ACID DEHYDROGENASE [LDH]: ICD-10-CM

## 2020-07-20 PROCEDURE — 74183 MRI ABD W/O CNTR FLWD CNTR: CPT

## 2020-07-20 PROCEDURE — 76391 MR ELASTOGRAPHY: CPT | Mod: 26

## 2020-07-20 PROCEDURE — A9585: CPT

## 2020-07-20 PROCEDURE — 76391 MR ELASTOGRAPHY: CPT

## 2020-07-20 PROCEDURE — 74183 MRI ABD W/O CNTR FLWD CNTR: CPT | Mod: 26

## 2020-08-24 LAB
A1AT SERPL-MCNC: 118 MG/DL
ALBUMIN SERPL ELPH-MCNC: 5 G/DL
ALP BLD-CCNC: 57 U/L
ALT SERPL-CCNC: 86 U/L
ANION GAP SERPL CALC-SCNC: 13 MMOL/L
AST SERPL-CCNC: 32 U/L
BASOPHILS # BLD AUTO: 0.02 K/UL
BASOPHILS NFR BLD AUTO: 0.5 %
BILIRUB SERPL-MCNC: 0.8 MG/DL
BUN SERPL-MCNC: 21 MG/DL
CALCIUM SERPL-MCNC: 9.4 MG/DL
CERULOPLASMIN SERPL-MCNC: 19 MG/DL
CHLORIDE SERPL-SCNC: 103 MMOL/L
CO2 SERPL-SCNC: 24 MMOL/L
CREAT SERPL-MCNC: 1.06 MG/DL
EOSINOPHIL # BLD AUTO: 0.03 K/UL
EOSINOPHIL NFR BLD AUTO: 0.7 %
FERRITIN SERPL-MCNC: 488 NG/ML
GLUCOSE SERPL-MCNC: 101 MG/DL
HCT VFR BLD CALC: 49.9 %
HGB BLD-MCNC: 16.5 G/DL
IMM GRANULOCYTES NFR BLD AUTO: 0.2 %
INR PPP: 0.97 RATIO
IRON SATN MFR SERPL: 48 %
IRON SERPL-MCNC: 138 UG/DL
LYMPHOCYTES # BLD AUTO: 1.7 K/UL
LYMPHOCYTES NFR BLD AUTO: 42.2 %
MAN DIFF?: NORMAL
MCHC RBC-ENTMCNC: 31.3 PG
MCHC RBC-ENTMCNC: 33.1 GM/DL
MCV RBC AUTO: 94.7 FL
MONOCYTES # BLD AUTO: 0.24 K/UL
MONOCYTES NFR BLD AUTO: 6 %
NEUTROPHILS # BLD AUTO: 2.03 K/UL
NEUTROPHILS NFR BLD AUTO: 50.4 %
PLATELET # BLD AUTO: 188 K/UL
POTASSIUM SERPL-SCNC: 4.3 MMOL/L
PROT SERPL-MCNC: 7.3 G/DL
PT BLD: 11.6 SEC
RBC # BLD: 5.27 M/UL
RBC # FLD: 11.7 %
SODIUM SERPL-SCNC: 140 MMOL/L
TIBC SERPL-MCNC: 288 UG/DL
UIBC SERPL-MCNC: 150 UG/DL
WBC # FLD AUTO: 4.03 K/UL

## 2020-08-25 LAB
ANA SER IF-ACNC: NEGATIVE
DEPRECATED KAPPA LC FREE/LAMBDA SER: 1.18 RATIO
IGA SER QL IEP: 212 MG/DL
IGG SER QL IEP: 1236 MG/DL
IGM SER QL IEP: 66 MG/DL
KAPPA LC CSF-MCNC: 1.24 MG/DL
KAPPA LC SERPL-MCNC: 1.46 MG/DL
SMOOTH MUSCLE AB SER QL IF: NORMAL

## 2020-08-27 LAB
CARDIOLIPIN AB SER IA-ACNC: NEGATIVE
LKM AB SER QL IF: <20.1 UNITS
TTG IGA SER IA-ACNC: <1.2 U/ML
TTG IGA SER-ACNC: NEGATIVE

## 2020-08-30 LAB — SOLUBLE LIVER IGG SER IA-ACNC: < 20.1 UNITS

## 2020-09-11 ENCOUNTER — APPOINTMENT (OUTPATIENT)
Dept: HEPATOLOGY | Facility: CLINIC | Age: 50
End: 2020-09-11

## 2020-12-14 ENCOUNTER — APPOINTMENT (OUTPATIENT)
Dept: HEPATOLOGY | Facility: CLINIC | Age: 50
End: 2020-12-14
Payer: COMMERCIAL

## 2020-12-14 VITALS
TEMPERATURE: 98 F | DIASTOLIC BLOOD PRESSURE: 87 MMHG | WEIGHT: 180 LBS | HEART RATE: 66 BPM | BODY MASS INDEX: 26.66 KG/M2 | OXYGEN SATURATION: 100 % | RESPIRATION RATE: 12 BRPM | HEIGHT: 69 IN | SYSTOLIC BLOOD PRESSURE: 129 MMHG

## 2020-12-14 DIAGNOSIS — N28.1 CYST OF KIDNEY, ACQUIRED: ICD-10-CM

## 2020-12-14 DIAGNOSIS — N18.2 CHRONIC KIDNEY DISEASE, STAGE 2 (MILD): ICD-10-CM

## 2020-12-14 DIAGNOSIS — K31.4 GASTRIC DIVERTICULUM: ICD-10-CM

## 2020-12-14 PROCEDURE — 99072 ADDL SUPL MATRL&STAF TM PHE: CPT

## 2020-12-14 PROCEDURE — 99215 OFFICE O/P EST HI 40 MIN: CPT

## 2020-12-15 PROBLEM — N18.2 STAGE 2 CHRONIC KIDNEY DISEASE: Status: ACTIVE | Noted: 2020-12-15

## 2020-12-15 NOTE — REVIEW OF SYSTEMS
[Eyesight Problems] : eyesight problems [Negative] : Heme/Lymph [Fever] : no fever [Chills] : no chills [Feeling Poorly] : not feeling poorly [Feeling Tired] : not feeling tired [Eye Pain] : no eye pain [Red Eyes] : eyes not red [Dry Eyes] : no dryness of the eyes [Chest Pain] : no chest pain [Palpitations] : no palpitations [Lower Ext Edema] : no extremity edema [Shortness Of Breath] : no shortness of breath [Wheezing] : no wheezing [Cough] : no cough [Abdominal Pain] : no abdominal pain [Vomiting] : no vomiting [Constipation] : no constipation [Diarrhea] : no diarrhea [Heartburn] : no heartburn [Melena] : no melena [Itching] : no itching [FreeTextEntry3] : right eye diagnosed with central serous retinopathy s/p laser treatment

## 2020-12-15 NOTE — HISTORY OF PRESENT ILLNESS
[___ Month(s) Ago] : [unfilled] month(s) ago [None] : ~he/she~ had no significant interval events [Travel to Endemic Area] : travel to an endemic area [Needlestick Exposure] : no needlestick exposure [IV Drug Use] : no IV drug use [Tattoo] : no tattoos [Hemodialysis] : no hemodialysis [Transfusion before 1992] : no transfusion before 1992 [Transplant before 1992] : no transplant before 1992 [Alcohol Abuse] : no alcohol abuse [Autoimmune Disorder] : no autoimmune disorder [Household Contact to HBV] : no household contact to HBV [Occupational Exposure] : no occupational exposure [Wt Gain ___ Lbs] : no recent weight gain [FreeTextEntry1] : - 20: first visit with me, here after recent MRE and complete serologic workup due to rise in LFTs while taking Tenofovir, doing well.\par \par SHx: emigrated from [place] in . His parents and brother reportedly do not have hepatitis B. His paternal grandfather  of liver cancer at the age of 40. His wife and children are vaccinated against hepatitis B. \par Weight hx: BMI 26, 180 lbs in 2020. Weight has been 170-180 with fluctuations long-term.\par Alcohol hx: no history of significant drinking.\par \par \par \par Workup:\par - 20 MR elastography: normal liver morphology, diffuse steatosis. Hemangioma 1.3 cm in segment 7. Posterior gastric diverticulum. Renal cysts <1cm, including hemorrhagic. Fat 29% (severe steatosis), Stiffness 2.6 kPa - normal/inflammation. Normal iron content. \par An ultrasound of the abdomen from 2016  reported mild hepatic steatosis, unchanged but no focal liver lesion was seen.\par 10/2016 HCV RNA was undetected, eAg negative, eAb positive, HBsAg positive, anti-HBs negative, AST 20, ALT 32, ALP 63, TB 0.7, CBC normal.\par 2016 abdominal US reported echogenic liver suggestive fatty liver, normal spleen and no ascites, patent portal venous system. Bone density was normal. \par 2017 HBV DNA undetected, eAb positive, ALT 37, immune to hepatitis A\par He was switched to Vemlidy since 2017. He tolerated Vemlidy without side effects. \par 2017 HBV DNA undetected, liver tests normal ALT was 32 \par 2018 alpha-fetoprotein was normal, eAb positive, HBV DNA undetected,  CBC was normal, liver tests were normal ALT was 29\par 2018 abdominal ultrasound reported diffuse fatty infiltration of the liver\par 2018 Fibroscan reported S3 steatosis and F0-F1 fibrosis. \par 2018 ALT 53, AST, ALP and TB were normal, AFP normal, HBV DNA undetected, HBsAg positive, anti-HBs negative, eAg negative, eAb positive, Abdominal US reported unchanged moderate diffuse hepatic steatosis, no focal liver lesion, patent portal veins, normal GB, and bile ducts, and spleen, and no ascites. \par He was diagnosed with central serous retinopathy (right eye) in 2019, underwent Laser treatment and on Eplerenone. \par 2019 normal CBC, AST 27, ALT 67, ALP 54, TB 0.8, AFP normal,  HBsAg positive, anti-HBs negative, HBV DNA undetected, Abdominal US from 2019 reported moderate diffuse fatty liver. No liver mass, normal spleen, and no ascites. \par \par \par \par \par \par \par

## 2020-12-15 NOTE — ASSESSMENT
[FreeTextEntry1] : Mr. LUCAS is a 50 year old man with chronic hepatitis B known since childhood in [place], and fatty liver, who was seen by Dr. Galvez until 3/2020. At that time, repeat workup was ordered because of ALT ~80 U/L. FHx of liver cancer in his grandfather, who  from it at age 40.\par \par - hepatitis B, eAb+\par   - on Vemlidy since . Was on Viread  -  after he developed elevated liver enzymes; tolerated it.\par   - viral load undetectable, LFTs normalized in 2014.\par \par - SANCHEZ  \par   - overweight, BMI 26\par   - fatty liver seen on US since \par   - MR elastography 2020: severe steatosis, no fibrosis\par   - 2018 Fibroscan reported S3 steatosis and F0-F1 fibrosis. \par   - elevated ALT ~ 80 U/L since 2018 which worsened\par \par - CKD2, high normal Creatinine. Possibly due to past Vemlidy. \par \par - gastric diverticulum seen on MRI: no clinical relevance.\par - renal cyst\par \par The nature of fatty liver disease with the possible development of cirrhosis with hepatic encephalopathy, ascites, esophageal variceal bleeding, liver cancer, chronic kidney injury, fatal complications after significant surgery, need for liver transplant, and death, was discussed.\par \par The need for a change in lifestyle was discussed, with significant reduction in caloric intake and increased physical activity. I discussed that either meal size or number of meals can be reduced and that periods of fasting, such as in interval fasting for 16 - 1.5 days, are safe and physiologic, unless diabetes medications are taken that can cause hypoglycemia. I discussed that a loss of 10% of body weight may improve fatty liver disease, but that more may be needed to stop it.\par I recommended to drink coffee, 4-6 cups per day if tolerated, as several retrospective studies suggest a dose-depended decrease of liver cancer in coffee-drinkers. He drinks tea, coffee only once per week.\par \par Exercise: reports that one knee s.t. gives way when he runs on a treadmill.\par \par Plan:\par - continue tenofovir\par - weight loss: reduced caloric intake, daily exercise. He should lose at least 10% of body weight\par - labs as below before next visit in 6 months, including A1AT level and repeat iron panel\par \par

## 2021-02-23 ENCOUNTER — NON-APPOINTMENT (OUTPATIENT)
Age: 51
End: 2021-02-23

## 2021-03-17 ENCOUNTER — APPOINTMENT (OUTPATIENT)
Age: 51
End: 2021-03-17

## 2021-05-17 ENCOUNTER — APPOINTMENT (OUTPATIENT)
Dept: ULTRASOUND IMAGING | Facility: CLINIC | Age: 51
End: 2021-05-17
Payer: COMMERCIAL

## 2021-05-17 PROCEDURE — 76700 US EXAM ABDOM COMPLETE: CPT

## 2021-05-26 ENCOUNTER — APPOINTMENT (OUTPATIENT)
Dept: INTERNAL MEDICINE | Facility: CLINIC | Age: 51
End: 2021-05-26
Payer: COMMERCIAL

## 2021-05-26 VITALS
SYSTOLIC BLOOD PRESSURE: 122 MMHG | WEIGHT: 166 LBS | DIASTOLIC BLOOD PRESSURE: 80 MMHG | HEART RATE: 69 BPM | HEIGHT: 68.25 IN | OXYGEN SATURATION: 98 % | BODY MASS INDEX: 25.16 KG/M2

## 2021-05-26 PROCEDURE — 99396 PREV VISIT EST AGE 40-64: CPT

## 2021-05-26 PROCEDURE — G0444 DEPRESSION SCREEN ANNUAL: CPT | Mod: 59

## 2021-05-26 PROCEDURE — 99072 ADDL SUPL MATRL&STAF TM PHE: CPT

## 2021-05-27 ENCOUNTER — NON-APPOINTMENT (OUTPATIENT)
Age: 51
End: 2021-05-27

## 2021-06-08 NOTE — HISTORY OF PRESENT ILLNESS
[FreeTextEntry1] : annual [de-identified] : Pt is feelng better than when we last caught up.  He is feeling better about pandemic having undergone vax in April.  rest of family is pending.  he has managed to bring weight down during pandemic - trimming weight 8#.  he has been working out on elliptical 45-50 min at a stretch for the past 6 months.  has experienced some nocturia 1-2x/night.  able to get back to sleep relatively easily though.  eye sx have stabilized and perhaps abated -- he self-d/c'd eplerenone some time ago.  he had vertigo sx and tinnitus briefly but these sx have abated as well.  he is using vemlidy, saw palmetto, eye multivitamins still.

## 2021-06-08 NOTE — PLAN
[FreeTextEntry1] : Pt known to have NAFLD, taking Vemlidy and seeming to tolerate.  central serous retinopathy sx meanwhile seem to have stabilized then abated, off medications.  he is with some prostatism sx - mostly nocturia - roughly stable and tolerable.  due to check metabolics.  will continue positive approach to exercise and will continue working on keeping best weight.  we discussed about colonoscopy which he will speak further about with his gastroenterologists.

## 2021-06-08 NOTE — HEALTH RISK ASSESSMENT
[Very Good] : ~his/her~  mood as very good [] : No [No] : In the past 12 months have you used drugs other than those required for medical reasons? No [No falls in past year] : Patient reported no falls in the past year [0] : 2) Feeling down, depressed, or hopeless: Not at all (0) [de-identified] : as in hpi [de-identified] : as in hpi [de-identified] : as in hpi [[id number]] : 0 [Change in mental status noted] : No change in mental status noted [Language] : denies difficulty with language [Behavior] : denies difficulty with behavior [Learning/Retaining New Information] : denies difficulty learning/retaining new information [Handling Complex Tasks] : denies difficulty handling complex tasks [Reasoning] : denies difficulty with reasoning [Spatial Ability and Orientation] : denies difficulty with spatial ability and orientation [None] : None [With Family] : lives with family [Employed] : employed [] :  [# Of Children ___] : has [unfilled] children [Feels Safe at Home] : Feels safe at home [Fully functional (bathing, dressing, toileting, transferring, walking, feeding)] : Fully functional (bathing, dressing, toileting, transferring, walking, feeding) [Fully functional (using the telephone, shopping, preparing meals, housekeeping, doing laundry, using] : Fully functional and needs no help or supervision to perform IADLs (using the telephone, shopping, preparing meals, housekeeping, doing laundry, using transportation, managing medications and managing finances) [Reports changes in hearing] : Reports no changes in hearing [Reports changes in vision] : Reports no changes in vision [Reports normal functional visual acuity (ie: able to read med bottle)] : Reports normal functional visual acuity [Reports changes in dental health] : Reports no changes in dental health

## 2021-06-08 NOTE — PHYSICAL EXAM
[Well Nourished] : well nourished [Well Developed] : well developed [Well-Appearing] : well-appearing [Normal Sclera/Conjunctiva] : normal sclera/conjunctiva [PERRL] : pupils equal round and reactive to light [EOMI] : extraocular movements intact [Normal Outer Ear/Nose] : the outer ears and nose were normal in appearance [Normal Oropharynx] : the oropharynx was normal [No JVD] : no jugular venous distention [No Lymphadenopathy] : no lymphadenopathy [Supple] : supple [Thyroid Normal, No Nodules] : the thyroid was normal and there were no nodules present [No Respiratory Distress] : no respiratory distress  [No Accessory Muscle Use] : no accessory muscle use [Clear to Auscultation] : lungs were clear to auscultation bilaterally [Normal Rate] : normal rate  [Regular Rhythm] : with a regular rhythm [Normal S1, S2] : normal S1 and S2 [No Murmur] : no murmur heard [No Carotid Bruits] : no carotid bruits [No Abdominal Bruit] : a ~M bruit was not heard ~T in the abdomen [No Varicosities] : no varicosities [Pedal Pulses Present] : the pedal pulses are present [No Edema] : there was no peripheral edema [No Palpable Aorta] : no palpable aorta [No Extremity Clubbing/Cyanosis] : no extremity clubbing/cyanosis [Soft] : abdomen soft [Non Tender] : non-tender [Non-distended] : non-distended [No Masses] : no abdominal mass palpated [No HSM] : no HSM [Normal Posterior Cervical Nodes] : no posterior cervical lymphadenopathy [Normal Bowel Sounds] : normal bowel sounds [Normal Anterior Cervical Nodes] : no anterior cervical lymphadenopathy [No CVA Tenderness] : no CVA  tenderness [No Spinal Tenderness] : no spinal tenderness [No Joint Swelling] : no joint swelling [Grossly Normal Strength/Tone] : grossly normal strength/tone [No Rash] : no rash [No Focal Deficits] : no focal deficits [Coordination Grossly Intact] : coordination grossly intact [Normal Gait] : normal gait [Deep Tendon Reflexes (DTR)] : deep tendon reflexes were 2+ and symmetric [Normal Affect] : the affect was normal [Normal Insight/Judgement] : insight and judgment were intact

## 2021-06-14 ENCOUNTER — APPOINTMENT (OUTPATIENT)
Dept: HEPATOLOGY | Facility: CLINIC | Age: 51
End: 2021-06-14
Payer: COMMERCIAL

## 2021-06-14 VITALS
RESPIRATION RATE: 12 BRPM | DIASTOLIC BLOOD PRESSURE: 78 MMHG | HEART RATE: 70 BPM | SYSTOLIC BLOOD PRESSURE: 112 MMHG | WEIGHT: 165 LBS | HEIGHT: 68 IN | BODY MASS INDEX: 25.01 KG/M2 | TEMPERATURE: 97.2 F

## 2021-06-14 PROCEDURE — 99072 ADDL SUPL MATRL&STAF TM PHE: CPT

## 2021-06-14 PROCEDURE — 99213 OFFICE O/P EST LOW 20 MIN: CPT

## 2021-06-14 NOTE — PHYSICAL EXAM
[General Appearance - Alert] : alert [General Appearance - In No Acute Distress] : in no acute distress [Sclera] : the sclera and conjunctiva were normal [Outer Ear] : the ears and nose were normal in appearance [Jugular Venous Distention Increased] : there was no jugular-venous distention [Neck Appearance] : the appearance of the neck was normal [] : no respiratory distress [Respiration, Rhythm And Depth] : normal respiratory rhythm and effort [Heart Sounds] : normal S1 and S2 [Heart Rate And Rhythm] : heart rate was normal and rhythm regular [Edema] : there was no peripheral edema [Bowel Sounds] : normal bowel sounds [Abdomen Tenderness] : non-tender [Abdomen Soft] : soft [No CVA Tenderness] : no ~M costovertebral angle tenderness [Abnormal Walk] : normal gait [Nail Clubbing] : no clubbing  or cyanosis of the fingernails [Skin Color & Pigmentation] : normal skin color and pigmentation [Oriented To Time, Place, And Person] : oriented to person, place, and time [Affect] : the affect was normal [Scleral Icterus] : No Scleral Icterus [Asterixis] : no asterixis observed [Jaundice] : No jaundice

## 2021-06-14 NOTE — HISTORY OF PRESENT ILLNESS
[___ Month(s) Ago] : [unfilled] month(s) ago [None] : ~he/she~ had no significant interval events [Needlestick Exposure] : no needlestick exposure [IV Drug Use] : no IV drug use [Tattoo] : no tattoos [Hemodialysis] : no hemodialysis [Transfusion before 1992] : no transfusion before 1992 [Transplant before 1992] : no transplant before 1992 [Alcohol Abuse] : no alcohol abuse [Autoimmune Disorder] : no autoimmune disorder [Household Contact to HBV] : no household contact to HBV [Occupational Exposure] : no occupational exposure [Fatigue] : denies fatigue [Malaise] : denies malaise [Fever] : denies fever [Diffuse Joint Pain (Arthralgias)] : denies arthralgias [Muscle Aches, Generalized (Myalgias)] : denies myalgias [Yellow Skin Or Eyes (Jaundice)] : denies jaundice [Abdominal Pain] : denies abdominal pain [Urine Tests Nonspecific Abnormal Findings Biliuria] : denies dark urine [Light Colored Bowel Movement (Acholic Stools)] : denies pale stools [Insomnia] : denies insomnia [Skin: Rash] : denies rash [Itching (Pruritus)] : denies pruritus [Shortness Of Breath] : denies shortness of breath [Wt Gain ___ Lbs] : no recent weight gain [FreeTextEntry1] : \par \par \par \par \par \par \par  [de-identified] : Mr. LUCAS is a 50 year old male emigrated from [place] in . His parents and brother reportedly do not have hepatitis B. His paternal grandfather  of liver cancer at the age of 40. His wife and children are vaccinated against hepatitis B. Presents to clinic today with no complaints, maintained on Vemlidy 25mg QD with no side effects. Denies smoking or drinking. Denies chest pain/palpitations, SOB, ab pain, n/v, melena/BRBPR, and jaundice. \par \par Workup:\par -2021: BW revealed AFP, CBC and LFT's WNL. HBV RNA remains undetected. US of ab -- moderate fatty infiltration. \par - 20 MR elastography: normal liver morphology, diffuse steatosis. Hemangioma 1.3 cm in segment 7. Posterior gastric diverticulum. Renal cysts <1cm, including hemorrhagic. Fat 29% (severe steatosis), Stiffness 2.6 kPa - normal/inflammation. Normal iron content. \par An ultrasound of the abdomen from 2016  reported mild hepatic steatosis, unchanged but no focal liver lesion was seen.\par 10/2016 HCV RNA was undetected, eAg negative, eAb positive, HBsAg positive, anti-HBs negative, AST 20, ALT 32, ALP 63, TB 0.7, CBC normal.\par 2016 abdominal US reported echogenic liver suggestive fatty liver, normal spleen and no ascites, patent portal venous system. Bone density was normal. \par 2017 HBV DNA undetected, eAb positive, ALT 37, immune to hepatitis A\par He was switched to Vemlidy since 2017. He tolerated Vemlidy without side effects. \par 2017 HBV DNA undetected, liver tests normal ALT was 32 \par 2018 alpha-fetoprotein was normal, eAb positive, HBV DNA undetected,  CBC was normal, liver tests were normal ALT was 29\par 2018 abdominal ultrasound reported diffuse fatty infiltration of the liver\par 2018 Fibroscan reported S3 steatosis and F0-F1 fibrosis. \par 2018 ALT 53, AST, ALP and TB were normal, AFP normal, HBV DNA undetected, HBsAg positive, anti-HBs negative, eAg negative, eAb positive, Abdominal US reported unchanged moderate diffuse hepatic steatosis, no focal liver lesion, patent portal veins, normal GB, and bile ducts, and spleen, and no ascites. \par He was diagnosed with central serous retinopathy (right eye) in 2019, underwent Laser treatment and on Eplerenone. \par 2019 normal CBC, AST 27, ALT 67, ALP 54, TB 0.8, AFP normal,  HBsAg positive, anti-HBs negative, HBV DNA undetected, Abdominal US from 2019 reported moderate diffuse fatty liver. No liver mass, normal spleen, and no ascites.

## 2021-06-14 NOTE — ASSESSMENT
[FreeTextEntry1] : Mr. LUCAS is a 50 year old male emigrated from [place] in . His parents and brother reportedly do not have hepatitis B. His paternal grandfather  of liver cancer at the age of 40. His wife and children are vaccinated against hepatitis B. Presents to clinic today with no complaints, maintained on Vemlidy 25mg QD with no side effects. Denies smoking or drinking.\par \par 1. HBV/SANCHEZ\par -LFT's WNL no s+s of decompensated liver disease noted on exam\par -remains on vemlidiy 25mg QD without any side effects, explained the nature of medication use for life long therapy \par -HCC: no lesions, + fatty liver seen on US again in , next HCC screen in 2021\par - I have explained the best treatment for fatty liver is diet and exercise. \par -I have encouraged continued weight loss of at least 5%. \par -I have encouraged a healthy lifestyle including exercising at least 3x times weekly and maintaining a diet low in carbohydrates, fat, sodium (<2gm daily) and cholesterol. \par -I have counseled pt on abstaining from alcohol and illicit drug use, avoid herbal and dietary supplements. Encouraged limit use of acetaminophen to <2gm per day and to avoid NSAIDS.    \par \par 2. HM\par -Col-- routine due GI booking ordered \par \par Plan:\par - continue vemlidy \par - weight loss: via diet changes and exercise\par -RTC Q6mns with repeat BW and imaging. \par \par

## 2021-07-28 ENCOUNTER — OUTPATIENT (OUTPATIENT)
Dept: OUTPATIENT SERVICES | Facility: HOSPITAL | Age: 51
LOS: 1 days | Discharge: HOME | End: 2021-07-28
Payer: COMMERCIAL

## 2021-07-28 ENCOUNTER — RESULT REVIEW (OUTPATIENT)
Age: 51
End: 2021-07-28

## 2021-07-28 ENCOUNTER — APPOINTMENT (OUTPATIENT)
Dept: HEPATOLOGY | Facility: HOSPITAL | Age: 51
End: 2021-07-28

## 2021-07-28 VITALS
TEMPERATURE: 98 F | HEART RATE: 60 BPM | SYSTOLIC BLOOD PRESSURE: 140 MMHG | OXYGEN SATURATION: 99 % | RESPIRATION RATE: 16 BRPM | DIASTOLIC BLOOD PRESSURE: 83 MMHG

## 2021-07-28 VITALS
RESPIRATION RATE: 20 BRPM | SYSTOLIC BLOOD PRESSURE: 133 MMHG | HEART RATE: 70 BPM | OXYGEN SATURATION: 98 % | DIASTOLIC BLOOD PRESSURE: 78 MMHG

## 2021-07-28 DIAGNOSIS — B18.1 CHRONIC VIRAL HEPATITIS B WITHOUT DELTA-AGENT: ICD-10-CM

## 2021-07-28 PROCEDURE — 88305 TISSUE EXAM BY PATHOLOGIST: CPT | Mod: 26

## 2021-07-28 PROCEDURE — 45380 COLONOSCOPY AND BIOPSY: CPT

## 2021-07-28 NOTE — ADDENDUM
[FreeTextEntry1] : - 7/238/21 colonscopy: good prep, diminutive cecal polyp, removed with forceps. Normal TI.

## 2021-07-30 LAB — SURGICAL PATHOLOGY STUDY: SIGNIFICANT CHANGE UP

## 2021-10-19 LAB
AFP-TM SERPL-MCNC: 3.2 NG/ML
ALBUMIN SERPL ELPH-MCNC: 5 G/DL
ALP BLD-CCNC: 60 U/L
ALT SERPL-CCNC: 35 U/L
ANION GAP SERPL CALC-SCNC: 14 MMOL/L
APPEARANCE: CLEAR
AST SERPL-CCNC: 21 U/L
BACTERIA: NEGATIVE
BASOPHILS # BLD AUTO: 0.02 K/UL
BASOPHILS NFR BLD AUTO: 0.5 %
BILIRUB SERPL-MCNC: 1 MG/DL
BILIRUBIN URINE: NEGATIVE
BLOOD URINE: NEGATIVE
BUN SERPL-MCNC: 15 MG/DL
CALCIUM SERPL-MCNC: 9.3 MG/DL
CHLORIDE SERPL-SCNC: 104 MMOL/L
CHOLEST SERPL-MCNC: 157 MG/DL
CO2 SERPL-SCNC: 25 MMOL/L
COLOR: NORMAL
CREAT SERPL-MCNC: 0.89 MG/DL
EOSINOPHIL # BLD AUTO: 0.07 K/UL
EOSINOPHIL NFR BLD AUTO: 1.7 %
ESTIMATED AVERAGE GLUCOSE: 108 MG/DL
GLUCOSE QUALITATIVE U: NEGATIVE
GLUCOSE SERPL-MCNC: 101 MG/DL
HBA1C MFR BLD HPLC: 5.4 %
HBV DNA # SERPL NAA+PROBE: NOT DETECTED
HCT VFR BLD CALC: 48.4 %
HDLC SERPL-MCNC: 64 MG/DL
HEPB DNA PCR LOG: NOT DETECTED LOG10IU/ML
HGB BLD-MCNC: 15.8 G/DL
HYALINE CASTS: 0 /LPF
IMM GRANULOCYTES NFR BLD AUTO: 0.2 %
KETONES URINE: NEGATIVE
LDLC SERPL CALC-MCNC: 77 MG/DL
LEUKOCYTE ESTERASE URINE: NEGATIVE
LYMPHOCYTES # BLD AUTO: 1.74 K/UL
LYMPHOCYTES NFR BLD AUTO: 42.5 %
MAN DIFF?: NORMAL
MCHC RBC-ENTMCNC: 31.3 PG
MCHC RBC-ENTMCNC: 32.6 GM/DL
MCV RBC AUTO: 95.8 FL
MICROSCOPIC-UA: NORMAL
MONOCYTES # BLD AUTO: 0.28 K/UL
MONOCYTES NFR BLD AUTO: 6.8 %
NEUTROPHILS # BLD AUTO: 1.97 K/UL
NEUTROPHILS NFR BLD AUTO: 48.3 %
NITRITE URINE: NEGATIVE
NONHDLC SERPL-MCNC: 93 MG/DL
PH URINE: 6
PLATELET # BLD AUTO: 177 K/UL
POTASSIUM SERPL-SCNC: 3.8 MMOL/L
PROT SERPL-MCNC: 7.7 G/DL
PROTEIN URINE: NEGATIVE
PSA SERPL-MCNC: 0.93 NG/ML
RBC # BLD: 5.05 M/UL
RBC # FLD: 12.2 %
RED BLOOD CELLS URINE: 0 /HPF
SODIUM SERPL-SCNC: 142 MMOL/L
SPECIFIC GRAVITY URINE: 1.02
SQUAMOUS EPITHELIAL CELLS: 0 /HPF
TRIGL SERPL-MCNC: 80 MG/DL
UROBILINOGEN URINE: NORMAL
WBC # FLD AUTO: 4.09 K/UL
WHITE BLOOD CELLS URINE: 0 /HPF

## 2022-01-04 ENCOUNTER — APPOINTMENT (OUTPATIENT)
Dept: HEPATOLOGY | Facility: CLINIC | Age: 52
End: 2022-01-04

## 2022-06-01 ENCOUNTER — NON-APPOINTMENT (OUTPATIENT)
Age: 52
End: 2022-06-01

## 2022-06-01 ENCOUNTER — APPOINTMENT (OUTPATIENT)
Dept: INTERNAL MEDICINE | Facility: CLINIC | Age: 52
End: 2022-06-01

## 2022-06-01 VITALS
SYSTOLIC BLOOD PRESSURE: 110 MMHG | HEART RATE: 79 BPM | HEIGHT: 78 IN | DIASTOLIC BLOOD PRESSURE: 64 MMHG | BODY MASS INDEX: 19.09 KG/M2 | OXYGEN SATURATION: 98 % | WEIGHT: 165 LBS

## 2022-06-01 DIAGNOSIS — H35.719 CENTRAL SEROUS CHORIORETINOPATHY, UNSPECIFIED EYE: ICD-10-CM

## 2022-06-01 PROCEDURE — G0444 DEPRESSION SCREEN ANNUAL: CPT | Mod: 59

## 2022-06-01 PROCEDURE — 99072 ADDL SUPL MATRL&STAF TM PHE: CPT

## 2022-06-01 PROCEDURE — 99396 PREV VISIT EST AGE 40-64: CPT | Mod: 25

## 2022-06-01 PROCEDURE — 93000 ELECTROCARDIOGRAM COMPLETE: CPT | Mod: 59

## 2022-07-29 ENCOUNTER — NON-APPOINTMENT (OUTPATIENT)
Age: 52
End: 2022-07-29

## 2022-07-29 PROBLEM — H35.719 CENTRAL SEROUS RETINOPATHY: Status: ACTIVE | Noted: 2019-08-09

## 2022-07-29 NOTE — PLAN
[FreeTextEntry1] : Pt has overall been in very good health clinically. ordering sonogram and labs to set up for upcoming hepatology/gi appointments. colonoscopy upcoming.  carpal tunnel - exercises, stretch, brace overnight.  has been covid vax x3.  will plan to exercise generally/stretch, continue to mind diet, maintain sleep at current level.

## 2022-07-29 NOTE — HEALTH RISK ASSESSMENT
[Very Good] : ~his/her~  mood as very good [0] : 2) Feeling down, depressed, or hopeless: Not at all (0) [PHQ-2 Negative - No further assessment needed] : PHQ-2 Negative - No further assessment needed [de-identified] : as in hpi [de-identified] : as in hpi [Language] : denies difficulty with language [Handling Complex Tasks] : denies difficulty handling complex tasks [None] : None

## 2022-07-29 NOTE — HISTORY OF PRESENT ILLNESS
[FreeTextEntry1] : annual [de-identified] : Pt is feeling generlaly well.  has seen GI/Hep about a year ago and is due for follow-up.  has been stretching, exercising.  is due for colonoscopy with GI soon.  has had sore muscles and stiffness at times, low back pain, but non-limiting to him ultimately.  has been using lutein for eyes - not following closely with ophtho at this point - has been ok with no major changes.  urination - 1x/nocturia, down from more frequent nocturia at times in the past.  sleeping about 6 hrs total.  carpal tunnel sx r > l.

## 2022-09-03 ENCOUNTER — APPOINTMENT (OUTPATIENT)
Dept: ULTRASOUND IMAGING | Facility: CLINIC | Age: 52
End: 2022-09-03

## 2022-09-03 ENCOUNTER — OUTPATIENT (OUTPATIENT)
Dept: OUTPATIENT SERVICES | Facility: HOSPITAL | Age: 52
LOS: 1 days | End: 2022-09-03
Payer: COMMERCIAL

## 2022-09-03 DIAGNOSIS — K75.81 NONALCOHOLIC STEATOHEPATITIS (NASH): ICD-10-CM

## 2022-09-03 DIAGNOSIS — B18.1 CHRONIC VIRAL HEPATITIS B WITHOUT DELTA-AGENT: ICD-10-CM

## 2022-09-03 PROCEDURE — 76700 US EXAM ABDOM COMPLETE: CPT

## 2022-09-03 PROCEDURE — 76700 US EXAM ABDOM COMPLETE: CPT | Mod: 26

## 2022-11-14 ENCOUNTER — APPOINTMENT (OUTPATIENT)
Dept: HEPATOLOGY | Facility: CLINIC | Age: 52
End: 2022-11-14

## 2022-11-14 VITALS
OXYGEN SATURATION: 98 % | BODY MASS INDEX: 26.36 KG/M2 | TEMPERATURE: 97.8 F | WEIGHT: 178 LBS | HEART RATE: 67 BPM | SYSTOLIC BLOOD PRESSURE: 134 MMHG | RESPIRATION RATE: 14 BRPM | HEIGHT: 69 IN | DIASTOLIC BLOOD PRESSURE: 82 MMHG

## 2022-11-14 PROCEDURE — 99213 OFFICE O/P EST LOW 20 MIN: CPT

## 2022-11-14 RX ORDER — POLYETHYLENE GLYCOL 3350 AND ELECTROLYTES WITH LEMON FLAVOR 236; 22.74; 6.74; 5.86; 2.97 G/4L; G/4L; G/4L; G/4L; G/4L
236 POWDER, FOR SOLUTION ORAL
Qty: 1 | Refills: 0 | Status: DISCONTINUED | COMMUNITY
Start: 2021-07-02 | End: 2022-11-14

## 2022-11-14 NOTE — ASSESSMENT
[FreeTextEntry1] : Mr. LUCAS is a 51 year old man with chronic hepatitis B known since childhood in [place], and fatty liver, who was seen by Dr. Galvez until 3/2020. At that time, repeat workup was ordered because of ALT ~80 U/L. FHx of liver cancer in his grandfather, who  from it at age 40.\par \par - hepatitis B, eAb+\par   - on Vemlidy since . Was on Viread  -  after he developed elevated liver enzymes; tolerated it.\par   - viral load undetectable, LFTs normalized in 2014, then mild ALT elevation between 2018 and 2020 - possibly due to increased weight >180 lbs during that time\par   - HCC screening: US 2022 negative, AFP nl.\par \par - SANCHEZ  \par   - overweight, BMI 26\par   - fatty liver seen on US since \par   - MR elastography 2020: severe steatosis, no fibrosis; 2018 Fibroscan reported S3 steatosis and F0-F1     fibrosis. \par   - elevated ALT as above - recently normal, possibly due to mild weight loss to below 180 lbs\par \par - CKD2, high normal Creatinine. Possibly due to past Vemlidy. \par \par - gastric diverticulum seen on MRI: no clinical relevance.\par - renal cyst\par \par New job as of  2022, now works from home mostly, sedentary. \par I recommended to drink coffee, 4-6 cups per day if tolerated, as several retrospective studies suggest a dose-depended decrease of liver cancer in coffee-drinkers. He drinks tea, coffee only once per week.\par \par Exercise: reports that one knee s.t. gives way when he runs on a treadmill.\par \par Plan:\par - continue tenofovir; return in 4 months after repeat bloodwork, US abdomen, and fibroscan.\par - weight loss: reduced caloric intake, daily exercise. He should lose at least 10% of body weight\par \par \par

## 2022-11-14 NOTE — HISTORY OF PRESENT ILLNESS
[___ Month(s) Ago] : [unfilled] month(s) ago [None] : ~he/she~ had no significant interval events [Travel to Endemic Area] : travel to an endemic area [Needlestick Exposure] : no needlestick exposure [IV Drug Use] : no IV drug use [Tattoo] : no tattoos [Hemodialysis] : no hemodialysis [Transfusion before 1992] : no transfusion before 1992 [Transplant before 1992] : no transplant before 1992 [Alcohol Abuse] : no alcohol abuse [Autoimmune Disorder] : no autoimmune disorder [Household Contact to HBV] : no household contact to HBV [Occupational Exposure] : no occupational exposure [Wt Gain ___ Lbs] : no recent weight gain [FreeTextEntry1] : - 22: returns after visit with NELL Ortiz NP in 2021. Had US and BW in  - LFTs nl, VL undetectable. Doing well. Gained 13 lbs since the summer, now 178 lbs. \par \par - 20: first visit with me, here after recent MRE and complete serologic workup due to rise in LFTs while taking Tenofovir, doing well.\par \par SHx: immigrated from [place] in . His parents and brother reportedly do not have hepatitis B. His paternal grandfather  of liver cancer at the age of 40. His wife and children are vaccinated against hepatitis B. \par Weight hx: BMI 26, 180 lbs in 2020. Weight has been 170-180 with fluctuations long-term.\par Alcohol hx: no history of significant drinking.\par \par Workup:\par - 22 US abdomen: hepatic steatosis, no hepatic mass. GB nl. \par - 20 MR elastography: normal liver morphology, diffuse steatosis. Hemangioma 1.3 cm in segment 7. Posterior gastric diverticulum. Renal cysts <1cm, including hemorrhagic. Fat 29% (severe steatosis), Stiffness 2.6 kPa - normal/inflammation. Normal iron content. \par An ultrasound of the abdomen from 2016  reported mild hepatic steatosis, unchanged but no focal liver lesion was seen.\par 10/2016 HCV RNA was undetected, eAg negative, eAb positive, HBsAg positive, anti-HBs negative, AST 20, ALT 32, ALP 63, TB 0.7, CBC normal.\par 2016 abdominal US reported echogenic liver suggestive fatty liver, normal spleen and no ascites, patent portal venous system. Bone density was normal. \par 2017 HBV DNA undetected, eAb positive, ALT 37, immune to hepatitis A\par He was switched to Vemlidy since 2017. He tolerated Vemlidy without side effects. \par 2017 HBV DNA undetected, liver tests normal ALT was 32 \par 2018 alpha-fetoprotein was normal, eAb positive, HBV DNA undetected,  CBC was normal, liver tests were normal ALT was 29\par 2018 abdominal ultrasound reported diffuse fatty infiltration of the liver\par 2018 Fibroscan reported S3 steatosis and F0-F1 fibrosis. \par 2018 ALT 53, AST, ALP and TB were normal, AFP normal, HBV DNA undetected, HBsAg positive, anti-HBs negative, eAg negative, eAb positive, Abdominal US reported unchanged moderate diffuse hepatic steatosis, no focal liver lesion, patent portal veins, normal GB, and bile ducts, and spleen, and no ascites. \par He was diagnosed with central serous retinopathy (right eye) in 2019, underwent Laser treatment and on Eplerenone. \par 2019 normal CBC, AST 27, ALT 67, ALP 54, TB 0.8, AFP normal,  HBsAg positive, anti-HBs negative, HBV DNA undetected, Abdominal US from 2019 reported moderate diffuse fatty liver. No liver mass, normal spleen, and no ascites. \par \par \par \par \par \par \par

## 2023-02-17 ENCOUNTER — OUTPATIENT (OUTPATIENT)
Dept: OUTPATIENT SERVICES | Facility: HOSPITAL | Age: 53
LOS: 1 days | End: 2023-02-17
Payer: COMMERCIAL

## 2023-02-17 ENCOUNTER — APPOINTMENT (OUTPATIENT)
Dept: ULTRASOUND IMAGING | Facility: CLINIC | Age: 53
End: 2023-02-17
Payer: COMMERCIAL

## 2023-02-17 DIAGNOSIS — B18.1 CHRONIC VIRAL HEPATITIS B WITHOUT DELTA-AGENT: ICD-10-CM

## 2023-02-17 DIAGNOSIS — K75.81 NONALCOHOLIC STEATOHEPATITIS (NASH): ICD-10-CM

## 2023-02-17 PROCEDURE — 76700 US EXAM ABDOM COMPLETE: CPT | Mod: 26

## 2023-02-17 PROCEDURE — 76700 US EXAM ABDOM COMPLETE: CPT

## 2023-03-08 LAB
AFP-TM SERPL-MCNC: 3.4 NG/ML
ALBUMIN SERPL ELPH-MCNC: 4.9 G/DL
ALP BLD-CCNC: 52 U/L
ALT SERPL-CCNC: 37 U/L
ANION GAP SERPL CALC-SCNC: 18 MMOL/L
AST SERPL-CCNC: 18 U/L
BILIRUB SERPL-MCNC: 0.4 MG/DL
BUN SERPL-MCNC: 18 MG/DL
CALCIUM SERPL-MCNC: 9.6 MG/DL
CHLORIDE SERPL-SCNC: 106 MMOL/L
CO2 SERPL-SCNC: 22 MMOL/L
CREAT SERPL-MCNC: 0.98 MG/DL
EGFR: 93 ML/MIN/1.73M2
GLUCOSE SERPL-MCNC: 110 MG/DL
HBV DNA # SERPL NAA+PROBE: <10 IU/ML
HEPB DNA PCR INT: DETECTED
HEPB DNA PCR LOG: <1 LOGIU/ML
POTASSIUM SERPL-SCNC: 4.1 MMOL/L
PROT SERPL-MCNC: 7.4 G/DL
SODIUM SERPL-SCNC: 145 MMOL/L

## 2023-03-13 ENCOUNTER — APPOINTMENT (OUTPATIENT)
Dept: HEPATOLOGY | Facility: CLINIC | Age: 53
End: 2023-03-13
Payer: COMMERCIAL

## 2023-03-13 VITALS
HEIGHT: 69 IN | WEIGHT: 178 LBS | TEMPERATURE: 97.8 F | SYSTOLIC BLOOD PRESSURE: 136 MMHG | BODY MASS INDEX: 26.36 KG/M2 | RESPIRATION RATE: 15 BRPM | DIASTOLIC BLOOD PRESSURE: 89 MMHG | OXYGEN SATURATION: 98 % | HEART RATE: 69 BPM

## 2023-03-13 PROCEDURE — 99213 OFFICE O/P EST LOW 20 MIN: CPT

## 2023-03-13 NOTE — HISTORY OF PRESENT ILLNESS
[___ Month(s) Ago] : [unfilled] month(s) ago [None] : ~he/she~ had no significant interval events [Travel to Endemic Area] : travel to an endemic area [Needlestick Exposure] : no needlestick exposure [IV Drug Use] : no IV drug use [Tattoo] : no tattoos [Hemodialysis] : no hemodialysis [Transfusion before 1992] : no transfusion before 1992 [Transplant before 1992] : no transplant before 1992 [Alcohol Abuse] : no alcohol abuse [Autoimmune Disorder] : no autoimmune disorder [Household Contact to HBV] : no household contact to HBV [Occupational Exposure] : no occupational exposure [Wt Gain ___ Lbs] : no recent weight gain [FreeTextEntry1] : - 3/13/23: returns after 4 months instead of one. BW 3/07: normal LFTs, HBV PCR < 10 IU/mL. AFP nl. Doing well.\par \par - 22: returns after visit with NELL Ortiz NP in 2021. Had US and BW in  - LFTs nl, VL undetectable. Doing well. Gained 13 lbs since the summer, now 178 lbs. \par \par - 20: first visit with me, here after recent MRE and complete serologic workup due to rise in LFTs while taking Tenofovir, doing well.\par \par SHx: immigrated from [place] in . His parents and brother reportedly do not have hepatitis B. His paternal grandfather  of liver cancer at the age of 40. His wife and children are vaccinated against hepatitis B. \par Weight hx: BMI 26, 180 lbs in 2020. Weight has been 170-180 with fluctuations long-term.\par Alcohol hx: no history of significant drinking.\par \par Workup:\par - 23 US abdomen; steatosis, no focal lesion. \par - 22 US abdomen: hepatic steatosis, no hepatic mass. GB nl. \par - 20 MR elastography: normal liver morphology, diffuse steatosis. Hemangioma 1.3 cm in segment 7. Posterior gastric diverticulum. Renal cysts <1cm, including hemorrhagic. Fat 29% (severe steatosis), Stiffness 2.6 kPa - normal/inflammation. Normal iron content. \par An ultrasound of the abdomen from 2016  reported mild hepatic steatosis, unchanged but no focal liver lesion was seen.\par 10/2016 HCV RNA was undetected, eAg negative, eAb positive, HBsAg positive, anti-HBs negative, AST 20, ALT 32, ALP 63, TB 0.7, CBC normal.\par 2016 abdominal US reported echogenic liver suggestive fatty liver, normal spleen and no ascites, patent portal venous system. Bone density was normal. \par 2017 HBV DNA undetected, eAb positive, ALT 37, immune to hepatitis A\par He was switched to Vemlidy since 2017. He tolerated Vemlidy without side effects. \par 2017 HBV DNA undetected, liver tests normal ALT was 32 \par 2018 alpha-fetoprotein was normal, eAb positive, HBV DNA undetected,  CBC was normal, liver tests were normal ALT was 29\par 2018 abdominal ultrasound reported diffuse fatty infiltration of the liver\par 2018 Fibroscan reported S3 steatosis and F0-F1 fibrosis. \par 2018 ALT 53, AST, ALP and TB were normal, AFP normal, HBV DNA undetected, HBsAg positive, anti-HBs negative, eAg negative, eAb positive, Abdominal US reported unchanged moderate diffuse hepatic steatosis, no focal liver lesion, patent portal veins, normal GB, and bile ducts, and spleen, and no ascites. \par He was diagnosed with central serous retinopathy (right eye) in 2019, underwent Laser treatment and on Eplerenone. \par 2019 normal CBC, AST 27, ALT 67, ALP 54, TB 0.8, AFP normal,  HBsAg positive, anti-HBs negative, HBV DNA undetected, Abdominal US from 2019 reported moderate diffuse fatty liver. No liver mass, normal spleen, and no ascites. \par \par \par \par \par \par \par

## 2023-03-13 NOTE — ASSESSMENT
[FreeTextEntry1] : Mr. LUCAS is a 52 year old man with chronic hepatitis B known since his childhood in [place], and fatty liver, who was seen by Dr. Galvez until 3/2020. At that time, repeat workup was ordered because of ALT ~80 U/L. FHx of liver cancer in his grandfather, who  from it at age 40.\par \par - hepatitis B, eAb+\par   - on Vemlidy since . Was on Viread  -  after he developed elevated liver enzymes; tolerated it.\par   - viral load undetectable, LFTs normalized in 2014, then mild ALT elevation between 2018 and 2020 - possibly due to increased weight >180 lbs during that time\par   - HCC screening: US 2023 negative, AFP nl.\par \par - SANCHEZ  \par   - overweight, BMI 26\par   - fatty liver seen on US since \par   - MR elastography 2020: severe steatosis, no fibrosis; 2018 Fibroscan reported S3 steatosis and F0-F1     fibrosis. \par   - elevated ALT as above - recently normal, possibly due to mild weight loss to below 180 lbs\par \par - CKD2, high normal Creatinine. Possibly due to past Vemlidy. \par \par - gastric diverticulum seen on MRI: no clinical relevance.\par - renal cyst\par \par New job as of  2022, now works from home mostly, sedentary. \par I recommended to drink coffee, 4-6 cups per day if tolerated, as several retrospective studies suggest a dose-depended decrease of liver cancer in coffee-drinkers. He started drinking 1-2 coups of coffee daily.\par \par Exercise: reports that one knee s.t. gives way when he runs on a treadmill.\par \par Plan:\par - continue tenofovir; return in 6 months after repeat bloodwork, US abdomen, and fibroscan.\par - weight loss: reduced caloric intake, daily exercise. He should lose at least 10% of body weight\par \par \par

## 2023-05-17 ENCOUNTER — NON-APPOINTMENT (OUTPATIENT)
Age: 53
End: 2023-05-17

## 2023-05-17 LAB — PSA SERPL-MCNC: 1.1 NG/ML

## 2023-05-25 ENCOUNTER — NON-APPOINTMENT (OUTPATIENT)
Age: 53
End: 2023-05-25

## 2023-06-07 ENCOUNTER — APPOINTMENT (OUTPATIENT)
Dept: INTERNAL MEDICINE | Facility: CLINIC | Age: 53
End: 2023-06-07
Payer: COMMERCIAL

## 2023-06-07 ENCOUNTER — NON-APPOINTMENT (OUTPATIENT)
Age: 53
End: 2023-06-07

## 2023-06-07 VITALS
WEIGHT: 178 LBS | SYSTOLIC BLOOD PRESSURE: 138 MMHG | HEART RATE: 77 BPM | HEIGHT: 69 IN | OXYGEN SATURATION: 97 % | BODY MASS INDEX: 26.36 KG/M2 | DIASTOLIC BLOOD PRESSURE: 88 MMHG

## 2023-06-07 DIAGNOSIS — Z80.0 FAMILY HISTORY OF MALIGNANT NEOPLASM OF DIGESTIVE ORGANS: ICD-10-CM

## 2023-06-07 DIAGNOSIS — N40.0 BENIGN PROSTATIC HYPERPLASIA WITHOUT LOWER URINARY TRACT SYMPMS: ICD-10-CM

## 2023-06-07 DIAGNOSIS — Z00.00 ENCOUNTER FOR GENERAL ADULT MEDICAL EXAMINATION W/OUT ABNORMAL FINDINGS: ICD-10-CM

## 2023-06-07 DIAGNOSIS — R94.31 ABNORMAL ELECTROCARDIOGRAM [ECG] [EKG]: ICD-10-CM

## 2023-06-07 DIAGNOSIS — Z83.438 FAMILY HISTORY OF OTHER DISORDER OF LIPOPROTEIN METABOLISM AND OTHER LIPIDEMIA: ICD-10-CM

## 2023-06-07 LAB
ALBUMIN SERPL ELPH-MCNC: 4.9 G/DL
ALP BLD-CCNC: 54 U/L
ALT SERPL-CCNC: 39 U/L
ANION GAP SERPL CALC-SCNC: 13 MMOL/L
AST SERPL-CCNC: 22 U/L
BILIRUB SERPL-MCNC: 0.8 MG/DL
BUN SERPL-MCNC: 20 MG/DL
CALCIUM SERPL-MCNC: 9.4 MG/DL
CHLORIDE SERPL-SCNC: 104 MMOL/L
CHOLEST SERPL-MCNC: 141 MG/DL
CO2 SERPL-SCNC: 26 MMOL/L
CREAT SERPL-MCNC: 1.01 MG/DL
EGFR: 90 ML/MIN/1.73M2
ESTIMATED AVERAGE GLUCOSE: 114 MG/DL
GLUCOSE SERPL-MCNC: 97 MG/DL
HBA1C MFR BLD HPLC: 5.6 %
HDLC SERPL-MCNC: 53 MG/DL
LDLC SERPL CALC-MCNC: 74 MG/DL
NONHDLC SERPL-MCNC: 88 MG/DL
POTASSIUM SERPL-SCNC: 4.1 MMOL/L
PROT SERPL-MCNC: 7.6 G/DL
PSA SERPL-MCNC: 1.34 NG/ML
SODIUM SERPL-SCNC: 143 MMOL/L
TRIGL SERPL-MCNC: 71 MG/DL

## 2023-06-07 PROCEDURE — 99396 PREV VISIT EST AGE 40-64: CPT | Mod: 25

## 2023-06-07 PROCEDURE — 93000 ELECTROCARDIOGRAM COMPLETE: CPT

## 2023-06-21 ENCOUNTER — NON-APPOINTMENT (OUTPATIENT)
Age: 53
End: 2023-06-21

## 2023-06-21 ENCOUNTER — APPOINTMENT (OUTPATIENT)
Dept: OPHTHALMOLOGY | Facility: CLINIC | Age: 53
End: 2023-06-21
Payer: COMMERCIAL

## 2023-06-21 PROCEDURE — 92002 INTRM OPH EXAM NEW PATIENT: CPT

## 2023-07-16 PROBLEM — N40.0 BPH (BENIGN PROSTATIC HYPERPLASIA): Status: ACTIVE | Noted: 2018-12-05

## 2023-07-16 PROBLEM — Z83.438 FAMILY HISTORY OF HYPERLIPIDEMIA: Status: ACTIVE | Noted: 2023-07-16

## 2023-07-16 PROBLEM — Z80.0 FAMILY HISTORY OF LIVER CANCER: Status: ACTIVE | Noted: 2023-07-16

## 2023-07-16 NOTE — PLAN
[FreeTextEntry1] : Pt has been generally well. does need continued ophtho/neuro-ophtho follow-up however, especially given double vision episode recently.  is scheduled for this already.  has had nocturia secondary to bph but ipss score low and would not use medications at this time.  following up diligently with hepatology and continues on vemlidy.  getting good sleep, getting back to best exercise and will increase this on elliptical into the coming months.  already has carbs reduce, and will continue to work on driving down caloric intake.

## 2023-07-16 NOTE — HISTORY OF PRESENT ILLNESS
[FreeTextEntry1] : annual [de-identified] : Pt has felt well overall. he has been working on exercise and diet. reduced carbohydrates.  has been taking ashwaganda.  Has been busy with new position.  nocturia x3 but able to get back to sleep OK, wakes up feeling rested. has had some vision sx - scotomata - went to ophtho - no retinal issues found.  had double vision episode though and for this will be seeing neuro-ophthalmology.  exercising on elliptical.  today we discussed family hx more - gf with liver ca, mother with bca, father with elevated lipids.  parents both 80.

## 2023-07-16 NOTE — HEALTH RISK ASSESSMENT
[Very Good] : ~his/her~  mood as very good [No] : In the past 12 months have you used drugs other than those required for medical reasons? No [0] : 2) Feeling down, depressed, or hopeless: Not at all (0) [PHQ-2 Negative - No further assessment needed] : PHQ-2 Negative - No further assessment needed [de-identified] : as in hpi [de-identified] : as in hpi [de-identified] : as in hpi [Change in mental status noted] : No change in mental status noted [Language] : denies difficulty with language [Handling Complex Tasks] : denies difficulty handling complex tasks [None] : None [With Family] : lives with family [Employed] : employed [] :  [# Of Children ___] : has [unfilled] children [Feels Safe at Home] : Feels safe at home [Reports changes in hearing] : Reports no changes in hearing [Reports changes in vision] : Reports no changes in vision [Reports normal functional visual acuity (ie: able to read med bottle)] : Reports normal functional visual acuity [Reports changes in dental health] : Reports no changes in dental health

## 2023-09-07 LAB
AFP-TM SERPL-MCNC: 3.3 NG/ML
ALBUMIN SERPL ELPH-MCNC: 4.8 G/DL
ALP BLD-CCNC: 56 U/L
ALT SERPL-CCNC: 54 U/L
ANION GAP SERPL CALC-SCNC: 12 MMOL/L
AST SERPL-CCNC: 22 U/L
BILIRUB SERPL-MCNC: 0.7 MG/DL
BUN SERPL-MCNC: 19 MG/DL
CALCIUM SERPL-MCNC: 9.4 MG/DL
CHLORIDE SERPL-SCNC: 105 MMOL/L
CO2 SERPL-SCNC: 25 MMOL/L
CREAT SERPL-MCNC: 1.01 MG/DL
EGFR: 90 ML/MIN/1.73M2
GLUCOSE SERPL-MCNC: 110 MG/DL
HEPB DNA PCR INT: NOT DETECTED
HEPB DNA PCR LOG: NOT DETECTED LOGIU/ML
POTASSIUM SERPL-SCNC: 4 MMOL/L
PROT SERPL-MCNC: 7.1 G/DL
SODIUM SERPL-SCNC: 142 MMOL/L

## 2023-09-08 ENCOUNTER — APPOINTMENT (OUTPATIENT)
Dept: ULTRASOUND IMAGING | Facility: CLINIC | Age: 53
End: 2023-09-08
Payer: COMMERCIAL

## 2023-09-08 ENCOUNTER — OUTPATIENT (OUTPATIENT)
Dept: OUTPATIENT SERVICES | Facility: HOSPITAL | Age: 53
LOS: 1 days | End: 2023-09-08
Payer: COMMERCIAL

## 2023-09-08 DIAGNOSIS — E66.3 OVERWEIGHT: ICD-10-CM

## 2023-09-08 PROCEDURE — 76700 US EXAM ABDOM COMPLETE: CPT

## 2023-09-08 PROCEDURE — 76700 US EXAM ABDOM COMPLETE: CPT | Mod: 26

## 2023-09-15 ENCOUNTER — APPOINTMENT (OUTPATIENT)
Dept: HEPATOLOGY | Facility: CLINIC | Age: 53
End: 2023-09-15
Payer: COMMERCIAL

## 2023-09-15 VITALS
DIASTOLIC BLOOD PRESSURE: 83 MMHG | OXYGEN SATURATION: 97 % | TEMPERATURE: 97 F | BODY MASS INDEX: 25.92 KG/M2 | WEIGHT: 175 LBS | HEART RATE: 74 BPM | HEIGHT: 69 IN | SYSTOLIC BLOOD PRESSURE: 123 MMHG

## 2023-09-15 PROCEDURE — 76981 USE PARENCHYMA: CPT

## 2023-09-15 PROCEDURE — 99214 OFFICE O/P EST MOD 30 MIN: CPT

## 2024-01-09 ENCOUNTER — APPOINTMENT (OUTPATIENT)
Dept: INTERNAL MEDICINE | Facility: CLINIC | Age: 54
End: 2024-01-09
Payer: COMMERCIAL

## 2024-01-09 ENCOUNTER — OUTPATIENT (OUTPATIENT)
Dept: OUTPATIENT SERVICES | Facility: HOSPITAL | Age: 54
LOS: 1 days | End: 2024-01-09

## 2024-01-09 VITALS
WEIGHT: 176 LBS | HEIGHT: 69 IN | OXYGEN SATURATION: 99 % | DIASTOLIC BLOOD PRESSURE: 70 MMHG | BODY MASS INDEX: 26.07 KG/M2 | SYSTOLIC BLOOD PRESSURE: 123 MMHG | HEART RATE: 78 BPM

## 2024-01-09 DIAGNOSIS — R05.1 ACUTE COUGH: ICD-10-CM

## 2024-01-09 PROCEDURE — 99213 OFFICE O/P EST LOW 20 MIN: CPT

## 2024-01-09 RX ORDER — BENZONATATE 200 MG/1
200 CAPSULE ORAL 3 TIMES DAILY
Qty: 42 | Refills: 0 | Status: ACTIVE | COMMUNITY
Start: 2024-01-09 | End: 1900-01-01

## 2024-01-09 NOTE — PHYSICAL EXAM
[Normal Outer Ear/Nose] : the outer ears and nose were normal in appearance [Normal Oropharynx] : the oropharynx was normal [Normal TMs] : both tympanic membranes were normal [No Lymphadenopathy] : no lymphadenopathy [Supple] : supple [No Respiratory Distress] : no respiratory distress  [No Accessory Muscle Use] : no accessory muscle use [Clear to Auscultation] : lungs were clear to auscultation bilaterally [Normal] : normal rate, regular rhythm, normal S1 and S2 and no murmur heard [Soft] : abdomen soft [Non Tender] : non-tender [Non-distended] : non-distended [Normal Bowel Sounds] : normal bowel sounds [Normal Supraclavicular Nodes] : no supraclavicular lymphadenopathy [Normal Posterior Cervical Nodes] : no posterior cervical lymphadenopathy [Normal Anterior Cervical Nodes] : no anterior cervical lymphadenopathy

## 2024-01-10 DIAGNOSIS — R05.1 ACUTE COUGH: ICD-10-CM

## 2024-01-12 NOTE — HISTORY OF PRESENT ILLNESS
[FreeTextEntry8] : The patient is a 54 y/o M who presents today with c/o body ache and cough. He reports that he had fever, body ache, and sore throat last week. now he has lingering cough. He is using Mucinex for symptomatic relief with only mild relief.  Home COVID test negative. Patient denies sick contacts, fever, chills, chest pain, palpitations, SOB and wheezing.

## 2024-01-12 NOTE — ASSESSMENT
[FreeTextEntry1] : The plan of care was discussed with the patient in full detail. He verbalized understanding and in agreement with the plan of care.

## 2024-03-08 ENCOUNTER — APPOINTMENT (OUTPATIENT)
Dept: ULTRASOUND IMAGING | Facility: CLINIC | Age: 54
End: 2024-03-08
Payer: COMMERCIAL

## 2024-03-08 ENCOUNTER — OUTPATIENT (OUTPATIENT)
Dept: OUTPATIENT SERVICES | Facility: HOSPITAL | Age: 54
LOS: 1 days | End: 2024-03-08
Payer: COMMERCIAL

## 2024-03-08 DIAGNOSIS — K75.81 NONALCOHOLIC STEATOHEPATITIS (NASH): ICD-10-CM

## 2024-03-08 DIAGNOSIS — B18.1 CHRONIC VIRAL HEPATITIS B WITHOUT DELTA-AGENT: ICD-10-CM

## 2024-03-08 PROCEDURE — 76700 US EXAM ABDOM COMPLETE: CPT | Mod: 26

## 2024-03-08 PROCEDURE — 76700 US EXAM ABDOM COMPLETE: CPT

## 2024-03-11 LAB
AFP-TM SERPL-MCNC: 3.2 NG/ML
ALBUMIN SERPL ELPH-MCNC: 4.8 G/DL
ALP BLD-CCNC: 58 U/L
ALT SERPL-CCNC: 54 U/L
ANION GAP SERPL CALC-SCNC: 15 MMOL/L
AST SERPL-CCNC: 22 U/L
BILIRUB SERPL-MCNC: 0.6 MG/DL
BUN SERPL-MCNC: 22 MG/DL
CALCIUM SERPL-MCNC: 9.3 MG/DL
CHLORIDE SERPL-SCNC: 104 MMOL/L
CO2 SERPL-SCNC: 23 MMOL/L
CREAT SERPL-MCNC: 0.99 MG/DL
EGFR: 92 ML/MIN/1.73M2
GLUCOSE SERPL-MCNC: 111 MG/DL
HBV SURFACE AB SER QL: NONREACTIVE
HBV SURFACE AG SER QL: REACTIVE
HEPB DNA PCR INT: NOT DETECTED
HEPB DNA PCR LOG: NOT DETECTED LOGIU/ML
POTASSIUM SERPL-SCNC: 4.1 MMOL/L
PROT SERPL-MCNC: 7.5 G/DL
SODIUM SERPL-SCNC: 141 MMOL/L

## 2024-03-15 ENCOUNTER — APPOINTMENT (OUTPATIENT)
Dept: HEPATOLOGY | Facility: CLINIC | Age: 54
End: 2024-03-15
Payer: COMMERCIAL

## 2024-03-15 VITALS
WEIGHT: 174 LBS | HEART RATE: 74 BPM | SYSTOLIC BLOOD PRESSURE: 124 MMHG | OXYGEN SATURATION: 99 % | HEIGHT: 69 IN | DIASTOLIC BLOOD PRESSURE: 86 MMHG | TEMPERATURE: 97.8 F | BODY MASS INDEX: 25.77 KG/M2

## 2024-03-15 DIAGNOSIS — K75.81 NONALCOHOLIC STEATOHEPATITIS (NASH): ICD-10-CM

## 2024-03-15 DIAGNOSIS — I49.9 CARDIAC ARRHYTHMIA, UNSPECIFIED: ICD-10-CM

## 2024-03-15 DIAGNOSIS — Z13.220 ENCOUNTER FOR SCREENING FOR LIPOID DISORDERS: ICD-10-CM

## 2024-03-15 DIAGNOSIS — R74.8 ABNORMAL LEVELS OF OTHER SERUM ENZYMES: ICD-10-CM

## 2024-03-15 DIAGNOSIS — E66.3 OVERWEIGHT: ICD-10-CM

## 2024-03-15 DIAGNOSIS — Z13.1 ENCOUNTER FOR SCREENING FOR DIABETES MELLITUS: ICD-10-CM

## 2024-03-15 DIAGNOSIS — B18.1 CHRONIC VIRAL HEPATITIS B W/OUT DELTA-AGENT: ICD-10-CM

## 2024-03-15 PROCEDURE — 76981 USE PARENCHYMA: CPT

## 2024-03-15 PROCEDURE — G2211 COMPLEX E/M VISIT ADD ON: CPT

## 2024-03-15 PROCEDURE — 99214 OFFICE O/P EST MOD 30 MIN: CPT

## 2024-03-15 RX ORDER — TENOFOVIR ALAFENAMIDE 25 MG/1
25 TABLET ORAL
Qty: 90 | Refills: 11 | Status: ACTIVE | COMMUNITY
Start: 2017-06-25 | End: 1900-01-01

## 2024-03-15 NOTE — HISTORY OF PRESENT ILLNESS
[FreeTextEntry1] : - 3/15/24: return after 1 year instead of six months. Had bloodwork, fibroscan and US abdomen. Weight unchanged. Tells me me that he has intermittent palpitations, every 3 weeks or so. Had some discomfort last time, a sting with every heartbeat; noticed during hot shower and before falling asleep. ECG reportedly normal. Exam: 176 lbs, BMI 26.0. Labs 3/8/24: abnormal: ALT 54, glc 111. Normal: rest of LFTs HBV PCR neg., AFP 3.2 ng/mL.   - 3/13/23: returns after 4 months instead of one. BW 3/07: normal LFTs, HBV PCR < 10 IU/mL. AFP nl. Doing well.  - 22: returns after visit with NELL Ortiz NP in 2021. Had US and BW in  - LFTs nl, VL undetectable. Doing well. Gained 13 lbs since the summer, now 178 lbs.   - 20: first visit with me, here after recent MRE and complete serologic workup due to rise in LFTs while taking Tenofovir, doing well.  SHx: immigrated from [place] in . His parents and brother reportedly do not have hepatitis B. His paternal grandfather  of liver cancer at the age of 40. His wife and children are vaccinated against hepatitis B.  Weight hx: BMI 26, 180 lbs in 2020. Weight has been 170-180 with fluctuations long-term. Alcohol hx: no history of significant drinking.  Workup: - 3/15/24 fibroscn: 5.9 kPa, 319 dB/m - F0, S3. No fibrosis, steatosis. - 3/11/24 US abdomen: hepatic steatosis, unchanged, minimal pericholecystic fat sparing - 9/15/23 fibroscan: 4.5kPa, 326 dB/m - F0, S3 - 23 US abdomen: steatosis, focal area of fat sparing, no focal hepatic lesion. GB normal.  - 23 US abdomen; steatosis, no focal lesion.  - 22 US abdomen: hepatic steatosis, no hepatic mass. GB nl.  - 21 colonoscopy: good prep, diminutive polyp. Path: lymphoid aggregate. - 20 MR elastography: normal liver morphology, diffuse steatosis. Hemangioma 1.3 cm in segment 7. Posterior gastric diverticulum. Renal cysts <1cm, including hemorrhagic. Fat 29% (severe steatosis), Stiffness 2.6 kPa - normal/inflammation. Normal iron content.  An ultrasound of the abdomen from 2016  reported mild hepatic steatosis, unchanged but no focal liver lesion was seen. 10/2016 HCV RNA was undetected, eAg negative, eAb positive, HBsAg positive, anti-HBs negative, AST 20, ALT 32, ALP 63, TB 0.7, CBC normal. 2016 abdominal US reported echogenic liver suggestive fatty liver, normal spleen and no ascites, patent portal venous system. Bone density was normal.  2017 HBV DNA undetected, eAb positive, ALT 37, immune to hepatitis A He was switched to Vemlidy since 2017. He tolerated Vemlidy without side effects.  2017 HBV DNA undetected, liver tests normal ALT was 32  2018 alpha-fetoprotein was normal, eAb positive, HBV DNA undetected,  CBC was normal, liver tests were normal ALT was 29 2018 abdominal ultrasound reported diffuse fatty infiltration of the liver 2018 Fibroscan reported S3 steatosis and F0-F1 fibrosis.  2018 ALT 53, AST, ALP and TB were normal, AFP normal, HBV DNA undetected, HBsAg positive, anti-HBs negative, eAg negative, eAb positive, Abdominal US reported unchanged moderate diffuse hepatic steatosis, no focal liver lesion, patent portal veins, normal GB, and bile ducts, and spleen, and no ascites.  He was diagnosed with central serous retinopathy (right eye) in 2019, underwent Laser treatment and on Eplerenone.  2019 normal CBC, AST 27, ALT 67, ALP 54, TB 0.8, AFP normal,  HBsAg positive, anti-HBs negative, HBV DNA undetected, Abdominal US from 2019 reported moderate diffuse fatty liver. No liver mass, normal spleen, and no ascites.

## 2024-03-15 NOTE — ASSESSMENT
[FreeTextEntry1] : Mr. LUCAS is a 53-year-old man with chronic hepatitis B known since his childhood in [place], and fatty liver, who was seen by Dr. Galvez until 3/2020. At that time, repeat workup was ordered because of ALT ~80 U/L. FHx of liver cancer in his grandfather who  from it at age 40.  - hepatitis B, eAb+   - s/p Viread  -  after he developed elevated liver enzymes, which normalized in 2014   - on Vemlidy since , viral load undetectable since then except a value <10 U/mL in 3/2023   - mild ALT elevation in 3314-1931 and again since 2023 - possibly due to increased weight >180 lbs during that time, then lost 14 lbs to 166 by 2021, regained weight to 178 lbs since 2022.   - HCC screening: US 2023 negative, AFP nl.  - SANCHEZ     - overweight, BMI 26. Normal HbA1c and lipid panel in 2023; no significant HTN.    - fatty liver seen on US since    - no fibrosis as estimated by elastographies: MR elastography 2020: severe steatosis, no fibrosis; 2018      Fibroscan reported S3 steatosis and F0-F1     fibrosis. Fibroscan 2023: F0, S3, Fibroscan 3/2024: no fibrosis,      severe steatosis.   - CKD2, high normal Creatinine. Possibly due to past Vemlidy.   - gastric diverticulum seen on MRI: no clinical relevance. - renal cyst  New job as of 2022, now works from home mostly, sedentary.  I recommended to drink coffee, 4-6 cups per day if tolerated, as several retrospective studies suggest a dose-depended decrease of liver cancer in coffee-drinkers. He started drinking 1-2 coups of coffee daily.  Exercise: reports that one knee s.t. gives way when he runs on a treadmill.  Plan: - continue tenofovir; return in 6 months after repeat bloodwork, US abdomen - weight loss: reduced caloric intake, daily exercise. He should lose at least 10% of body weight. Does not fulfil criteria for semaglutide or tirzepatide.  - palpitations: F/u with PCP

## 2024-06-12 ENCOUNTER — APPOINTMENT (OUTPATIENT)
Dept: INTERNAL MEDICINE | Facility: CLINIC | Age: 54
End: 2024-06-12

## 2024-08-07 ENCOUNTER — OUTPATIENT (OUTPATIENT)
Dept: OUTPATIENT SERVICES | Facility: HOSPITAL | Age: 54
LOS: 1 days | End: 2024-08-07

## 2024-08-07 ENCOUNTER — APPOINTMENT (OUTPATIENT)
Dept: INTERNAL MEDICINE | Facility: CLINIC | Age: 54
End: 2024-08-07

## 2024-08-07 PROBLEM — R09.81 SINUS CONGESTION: Status: ACTIVE | Noted: 2024-08-07

## 2024-08-07 PROCEDURE — 99396 PREV VISIT EST AGE 40-64: CPT

## 2024-08-21 DIAGNOSIS — N40.0 BENIGN PROSTATIC HYPERPLASIA WITHOUT LOWER URINARY TRACT SYMPTOMS: ICD-10-CM

## 2024-08-21 DIAGNOSIS — Z00.00 ENCOUNTER FOR GENERAL ADULT MEDICAL EXAMINATION WITHOUT ABNORMAL FINDINGS: ICD-10-CM

## 2024-08-21 DIAGNOSIS — R94.31 ABNORMAL ELECTROCARDIOGRAM [ECG] [EKG]: ICD-10-CM

## 2024-08-21 DIAGNOSIS — R09.81 NASAL CONGESTION: ICD-10-CM

## 2024-08-21 DIAGNOSIS — B18.1 CHRONIC VIRAL HEPATITIS B WITHOUT DELTA-AGENT: ICD-10-CM

## 2024-09-07 ENCOUNTER — LABORATORY RESULT (OUTPATIENT)
Age: 54
End: 2024-09-07

## 2024-09-07 ENCOUNTER — APPOINTMENT (OUTPATIENT)
Dept: ULTRASOUND IMAGING | Facility: CLINIC | Age: 54
End: 2024-09-07
Payer: COMMERCIAL

## 2024-09-07 ENCOUNTER — OUTPATIENT (OUTPATIENT)
Dept: OUTPATIENT SERVICES | Facility: HOSPITAL | Age: 54
LOS: 1 days | End: 2024-09-07
Payer: COMMERCIAL

## 2024-09-07 DIAGNOSIS — Z13.220 ENCOUNTER FOR SCREENING FOR LIPOID DISORDERS: ICD-10-CM

## 2024-09-07 DIAGNOSIS — K75.81 NONALCOHOLIC STEATOHEPATITIS (NASH): ICD-10-CM

## 2024-09-07 DIAGNOSIS — B18.1 CHRONIC VIRAL HEPATITIS B WITHOUT DELTA-AGENT: ICD-10-CM

## 2024-09-07 DIAGNOSIS — R74.8 ABNORMAL LEVELS OF OTHER SERUM ENZYMES: ICD-10-CM

## 2024-09-07 PROCEDURE — 76700 US EXAM ABDOM COMPLETE: CPT | Mod: 26

## 2024-09-07 PROCEDURE — 76700 US EXAM ABDOM COMPLETE: CPT

## 2024-09-13 ENCOUNTER — APPOINTMENT (OUTPATIENT)
Dept: HEPATOLOGY | Facility: CLINIC | Age: 54
End: 2024-09-13
Payer: COMMERCIAL

## 2024-09-13 VITALS
HEART RATE: 72 BPM | OXYGEN SATURATION: 96 % | HEIGHT: 69 IN | SYSTOLIC BLOOD PRESSURE: 144 MMHG | RESPIRATION RATE: 16 BRPM | DIASTOLIC BLOOD PRESSURE: 90 MMHG | TEMPERATURE: 98.3 F | BODY MASS INDEX: 26.36 KG/M2 | WEIGHT: 178 LBS

## 2024-09-13 DIAGNOSIS — K75.81 NONALCOHOLIC STEATOHEPATITIS (NASH): ICD-10-CM

## 2024-09-13 DIAGNOSIS — R73.02 IMPAIRED GLUCOSE TOLERANCE (ORAL): ICD-10-CM

## 2024-09-13 DIAGNOSIS — B18.1 CHRONIC VIRAL HEPATITIS B W/OUT DELTA-AGENT: ICD-10-CM

## 2024-09-13 DIAGNOSIS — E66.3 OVERWEIGHT: ICD-10-CM

## 2024-09-13 PROCEDURE — G2211 COMPLEX E/M VISIT ADD ON: CPT | Mod: NC

## 2024-09-13 PROCEDURE — 99213 OFFICE O/P EST LOW 20 MIN: CPT

## 2024-09-13 NOTE — ASSESSMENT
[FreeTextEntry1] : Mr. LUCAS is a 53-year-old man with chronic hepatitis B known since his childhood in [place], and fatty liver, who was seen by Dr. Galvez until 3/2020. FHx of liver cancer in his grandfather who  from it at age 40.  - hepatitis B, eAb+   - s/p Viread  -  after he developed elevated liver enzymes   - on Vemlidy since , viral load undetectable since then except a value <10 U/mL in 3/2023   - mild ALT elevation in 0544-1706 and again since 2023 - possibly due to increased weight and MASH, then lost 14 lbs to 166 by 2021, regained weight to 178 lbs since 2022.   - HCC screening: US 2023 negative, AFP nl.  - MASH     - overweight, BMI 26. Intermittently HbA1c in range of glucose intolerance, normal ipid panel in 2023; no          significant HTN.    - fatty liver seen on US since    - no fibrosis as estimated by elastographies: MR elastography 2020: severe steatosis, no fibrosis; 2018      Fibroscan reported S3 steatosis and F0-F1     fibrosis. Fibroscan 2023: F0, S3, Fibroscan 3/2024: no fibrosis,      severe steatosis.  - CKD2, high normal Creatinine. Possibly due to past Vemlidy.   - gastric diverticulum seen on MRI: no clinical relevance. - renal cyst  # colonoscopy : good prep, no adenoma. Repeat after 10 years.  New job as of 2022, now works from home mostly, sedentary.  I recommended to drink coffee, 4-6 cups per day if tolerated, as several retrospective studies suggest a dose-depended decrease of liver cancer in coffee-drinkers. He started drinking 1-2 coups of coffee daily.  Exercise: reports that one knee s.t. gives way when he runs on a treadmill.  Plan: - continue tenofovir; return in 6 months after repeat bloodwork, US abdomen - weight loss: reduced caloric intake, daily exercise. He should lose at least 10% of body weight. Does not fulfil criteria for semaglutide or tirzepatide. - will repeat fibroscan after a couple of years or so - palpitations: F/u with PCP

## 2024-09-13 NOTE — HISTORY OF PRESENT ILLNESS
[FreeTextEntry1] : - 24: returns after bloodwork and US. Doing well. Current job is from home, sedentary. Exam:  Labs 24: abnormal: ALT 57, glc 100. Normal: rest of CMP, HBV PCR (negative), AFP 3.1 ng/mL  - 3/15/24: return after 1 year instead of six months. Had bloodwork, fibroscan and US abdomen. Weight unchanged. Tells me me that he has intermittent palpitations, every 3 weeks or so. Had some discomfort last time, a sting with every heartbeat; noticed during hot shower and before falling asleep. ECG reportedly normal. Exam: 176 lbs, BMI 26.0. Labs 3/8/24: abnormal: ALT 54, glc 111. Normal: rest of LFTs HBV PCR neg., AFP 3.2 ng/mL.   - 3/13/23: returns after 4 months instead of one. BW 3/07: normal LFTs, HBV PCR < 10 IU/mL. AFP nl. Doing well.  - 22: returns after visit with NELL Ortiz NP in 2021. Had US and BW in  - LFTs nl, VL undetectable. Doing well. Gained 13 lbs since the summer, now 178 lbs.   - 20: first visit with me, here after recent MRE and complete serologic workup due to rise in LFTs while taking Tenofovir, doing well.  SHx: immigrated from [place] in . His parents and brother reportedly do not have hepatitis B. His paternal grandfather  of liver cancer at the age of 40. His wife and children are vaccinated against hepatitis B.  Weight hx: BMI 26, 180 lbs in 2020. Weight has been 170-180 with fluctuations long-term. Alcohol hx: no history of significant drinking.  Workup: - 24 US abdomen: fatty liver, no focal lesion. Gallbladder and kidneys normal [hepatic hemangioma not seen] - 3/15/24 fibroscan: 5.9 kPa, 319 dB/m - F0, S3. No fibrosis, steatosis. - 3/11/24 US abdomen: hepatic steatosis, unchanged, minimal pericholecystic fat sparing - 9/15/23 fibroscan: 4.5kPa, 326 dB/m - F0, S3 - 23 US abdomen: steatosis, focal area of fat sparing, no focal hepatic lesion. GB normal.  - 23 US abdomen; steatosis, no focal lesion.  - 22 US abdomen: hepatic steatosis, no hepatic mass. GB nl.  - 21 colonoscopy: good prep, diminutive polyp. Path: lymphoid aggregate. - 20 MR elastography: normal liver morphology, diffuse steatosis. Hemangioma 1.3 cm in segment 7. Posterior gastric diverticulum. Renal cysts <1cm, including hemorrhagic. Fat 29% (severe steatosis), Stiffness 2.6 kPa - normal/inflammation. Normal iron content.  An ultrasound of the abdomen from 2016  reported mild hepatic steatosis, unchanged but no focal liver lesion was seen. 10/2016 HCV RNA was undetected, eAg negative, eAb positive, HBsAg positive, anti-HBs negative, AST 20, ALT 32, ALP 63, TB 0.7, CBC normal. 2016 abdominal US reported echogenic liver suggestive fatty liver, normal spleen and no ascites, patent portal venous system. Bone density was normal.  2017 HBV DNA undetected, eAb positive, ALT 37, immune to hepatitis A He was switched to Vemlidy since 2017. He tolerated Vemlidy without side effects.  2017 HBV DNA undetected, liver tests normal ALT was 32  2018 alpha-fetoprotein was normal, eAb positive, HBV DNA undetected,  CBC was normal, liver tests were normal ALT was 29 2018 abdominal ultrasound reported diffuse fatty infiltration of the liver 2018 Fibroscan reported S3 steatosis and F0-F1 fibrosis.  2018 ALT 53, AST, ALP and TB were normal, AFP normal, HBV DNA undetected, HBsAg positive, anti-HBs negative, eAg negative, eAb positive, Abdominal US reported unchanged moderate diffuse hepatic steatosis, no focal liver lesion, patent portal veins, normal GB, and bile ducts, and spleen, and no ascites.  He was diagnosed with central serous retinopathy (right eye) in 2019, underwent Laser treatment and on Eplerenone.  2019 normal CBC, AST 27, ALT 67, ALP 54, TB 0.8, AFP normal,  HBsAg positive, anti-HBs negative, HBV DNA undetected, Abdominal US from 2019 reported moderate diffuse fatty liver. No liver mass, normal spleen, and no ascites.

## 2025-02-25 ENCOUNTER — APPOINTMENT (OUTPATIENT)
Dept: ULTRASOUND IMAGING | Facility: CLINIC | Age: 55
End: 2025-02-25
Payer: COMMERCIAL

## 2025-02-25 ENCOUNTER — OUTPATIENT (OUTPATIENT)
Dept: OUTPATIENT SERVICES | Facility: HOSPITAL | Age: 55
LOS: 1 days | End: 2025-02-25
Payer: COMMERCIAL

## 2025-02-25 DIAGNOSIS — R73.02 IMPAIRED GLUCOSE TOLERANCE (ORAL): ICD-10-CM

## 2025-02-25 DIAGNOSIS — B18.1 CHRONIC VIRAL HEPATITIS B WITHOUT DELTA-AGENT: ICD-10-CM

## 2025-02-25 DIAGNOSIS — K75.81 NONALCOHOLIC STEATOHEPATITIS (NASH): ICD-10-CM

## 2025-02-25 LAB
ALBUMIN SERPL ELPH-MCNC: 4.7 G/DL
ALP BLD-CCNC: 65 U/L
ALT SERPL-CCNC: 56 U/L
ANION GAP SERPL CALC-SCNC: 11 MMOL/L
AST SERPL-CCNC: 25 U/L
BASOPHILS # BLD AUTO: 0.02 K/UL
BASOPHILS NFR BLD AUTO: 0.5 %
BILIRUB SERPL-MCNC: 0.7 MG/DL
BUN SERPL-MCNC: 24 MG/DL
CALCIUM SERPL-MCNC: 9.3 MG/DL
CHLORIDE SERPL-SCNC: 107 MMOL/L
CO2 SERPL-SCNC: 25 MMOL/L
CREAT SERPL-MCNC: 0.98 MG/DL
EGFR: 92 ML/MIN/1.73M2
EOSINOPHIL # BLD AUTO: 0.06 K/UL
EOSINOPHIL NFR BLD AUTO: 1.5 %
GLUCOSE SERPL-MCNC: 111 MG/DL
HCT VFR BLD CALC: 48.7 %
HGB BLD-MCNC: 16.1 G/DL
IMM GRANULOCYTES NFR BLD AUTO: 0.2 %
INR PPP: 0.93 RATIO
LYMPHOCYTES # BLD AUTO: 1.5 K/UL
LYMPHOCYTES NFR BLD AUTO: 36.7 %
MAN DIFF?: NORMAL
MCHC RBC-ENTMCNC: 30.9 PG
MCHC RBC-ENTMCNC: 33.1 G/DL
MCV RBC AUTO: 93.5 FL
MONOCYTES # BLD AUTO: 0.26 K/UL
MONOCYTES NFR BLD AUTO: 6.4 %
NEUTROPHILS # BLD AUTO: 2.24 K/UL
NEUTROPHILS NFR BLD AUTO: 54.7 %
PLATELET # BLD AUTO: 185 K/UL
POTASSIUM SERPL-SCNC: 4.2 MMOL/L
PROT SERPL-MCNC: 7.4 G/DL
PT BLD: 11.1 SEC
RBC # BLD: 5.21 M/UL
RBC # FLD: 11.6 %
SODIUM SERPL-SCNC: 143 MMOL/L
WBC # FLD AUTO: 4.09 K/UL

## 2025-02-25 PROCEDURE — 76700 US EXAM ABDOM COMPLETE: CPT

## 2025-02-25 PROCEDURE — 76700 US EXAM ABDOM COMPLETE: CPT | Mod: 26

## 2025-02-26 LAB
HEPB DNA PCR INT: NOT DETECTED
HEPB DNA PCR LOG: NOT DETECTED LOGIU/ML

## 2025-03-03 LAB
ALPHA-1-FETOPROTEIN-L3: NORMAL %
ALPHA-1-FETOPROTEIN: 3.2 NG/ML

## 2025-03-04 ENCOUNTER — APPOINTMENT (OUTPATIENT)
Dept: HEPATOLOGY | Facility: CLINIC | Age: 55
End: 2025-03-04
Payer: COMMERCIAL

## 2025-03-04 VITALS
DIASTOLIC BLOOD PRESSURE: 87 MMHG | BODY MASS INDEX: 27.25 KG/M2 | HEIGHT: 69 IN | TEMPERATURE: 98.2 F | SYSTOLIC BLOOD PRESSURE: 130 MMHG | OXYGEN SATURATION: 98 % | HEART RATE: 76 BPM | WEIGHT: 184 LBS

## 2025-03-04 DIAGNOSIS — B18.1 CHRONIC VIRAL HEPATITIS B W/OUT DELTA-AGENT: ICD-10-CM

## 2025-03-04 DIAGNOSIS — R73.02 IMPAIRED GLUCOSE TOLERANCE (ORAL): ICD-10-CM

## 2025-03-04 DIAGNOSIS — K75.81 NONALCOHOLIC STEATOHEPATITIS (NASH): ICD-10-CM

## 2025-03-04 DIAGNOSIS — E66.3 OVERWEIGHT: ICD-10-CM

## 2025-03-04 DIAGNOSIS — Z13.220 ENCOUNTER FOR SCREENING FOR LIPOID DISORDERS: ICD-10-CM

## 2025-03-04 PROCEDURE — 99214 OFFICE O/P EST MOD 30 MIN: CPT

## 2025-03-04 PROCEDURE — G2211 COMPLEX E/M VISIT ADD ON: CPT | Mod: NC

## 2025-05-27 ENCOUNTER — OUTPATIENT (OUTPATIENT)
Dept: OUTPATIENT SERVICES | Facility: HOSPITAL | Age: 55
LOS: 1 days | End: 2025-05-27

## 2025-05-27 ENCOUNTER — NON-APPOINTMENT (OUTPATIENT)
Age: 55
End: 2025-05-27

## 2025-05-27 ENCOUNTER — APPOINTMENT (OUTPATIENT)
Dept: INTERNAL MEDICINE | Facility: CLINIC | Age: 55
End: 2025-05-27

## 2025-05-27 VITALS
WEIGHT: 185 LBS | RESPIRATION RATE: 16 BRPM | BODY MASS INDEX: 27.4 KG/M2 | DIASTOLIC BLOOD PRESSURE: 76 MMHG | OXYGEN SATURATION: 97 % | HEIGHT: 69 IN | HEART RATE: 65 BPM | SYSTOLIC BLOOD PRESSURE: 116 MMHG

## 2025-05-27 DIAGNOSIS — Z00.00 ENCOUNTER FOR GENERAL ADULT MEDICAL EXAMINATION W/OUT ABNORMAL FINDINGS: ICD-10-CM

## 2025-05-27 DIAGNOSIS — M21.371 FOOT DROP, RIGHT FOOT: ICD-10-CM

## 2025-05-27 LAB
ALBUMIN SERPL ELPH-MCNC: 4.8 G/DL
ALP BLD-CCNC: 62 U/L
ALT SERPL-CCNC: 69 U/L
ANION GAP SERPL CALC-SCNC: 13 MMOL/L
AST SERPL-CCNC: 28 U/L
BASOPHILS # BLD AUTO: 0.03 K/UL
BASOPHILS NFR BLD AUTO: 0.6 %
BILIRUB SERPL-MCNC: 0.5 MG/DL
BUN SERPL-MCNC: 18 MG/DL
CALCIUM SERPL-MCNC: 9.2 MG/DL
CHLORIDE SERPL-SCNC: 103 MMOL/L
CHOLEST SERPL-MCNC: 151 MG/DL
CO2 SERPL-SCNC: 24 MMOL/L
CREAT SERPL-MCNC: 0.98 MG/DL
EGFRCR SERPLBLD CKD-EPI 2021: 92 ML/MIN/1.73M2
EOSINOPHIL # BLD AUTO: 0.11 K/UL
EOSINOPHIL NFR BLD AUTO: 2.4 %
GLUCOSE SERPL-MCNC: 99 MG/DL
HCT VFR BLD CALC: 47.9 %
HDLC SERPL-MCNC: 55 MG/DL
HGB BLD-MCNC: 16.1 G/DL
IMM GRANULOCYTES NFR BLD AUTO: 0.2 %
LDLC SERPL-MCNC: 77 MG/DL
LYMPHOCYTES # BLD AUTO: 2.09 K/UL
LYMPHOCYTES NFR BLD AUTO: 45.1 %
MAN DIFF?: NORMAL
MCHC RBC-ENTMCNC: 31.9 PG
MCHC RBC-ENTMCNC: 33.6 G/DL
MCV RBC AUTO: 95 FL
MONOCYTES # BLD AUTO: 0.34 K/UL
MONOCYTES NFR BLD AUTO: 7.3 %
NEUTROPHILS # BLD AUTO: 2.05 K/UL
NEUTROPHILS NFR BLD AUTO: 44.4 %
NONHDLC SERPL-MCNC: 96 MG/DL
PLATELET # BLD AUTO: 188 K/UL
POTASSIUM SERPL-SCNC: 4.3 MMOL/L
PROT SERPL-MCNC: 7.6 G/DL
PSA SERPL-MCNC: 1.24 NG/ML
RBC # BLD: 5.04 M/UL
RBC # FLD: 12.2 %
SODIUM SERPL-SCNC: 141 MMOL/L
TRIGL SERPL-MCNC: 105 MG/DL
TSH SERPL-ACNC: 2.32 UIU/ML
WBC # FLD AUTO: 4.63 K/UL

## 2025-05-27 PROCEDURE — 99396 PREV VISIT EST AGE 40-64: CPT

## 2025-05-28 LAB
ESTIMATED AVERAGE GLUCOSE: 114 MG/DL
HBA1C MFR BLD HPLC: 5.6 %

## 2025-09-04 ENCOUNTER — RX RENEWAL (OUTPATIENT)
Age: 55
End: 2025-09-04

## 2025-09-12 ENCOUNTER — APPOINTMENT (OUTPATIENT)
Dept: HEPATOLOGY | Facility: CLINIC | Age: 55
End: 2025-09-12